# Patient Record
Sex: FEMALE | Race: WHITE | Employment: FULL TIME | URBAN - METROPOLITAN AREA
[De-identification: names, ages, dates, MRNs, and addresses within clinical notes are randomized per-mention and may not be internally consistent; named-entity substitution may affect disease eponyms.]

---

## 2017-03-08 ENCOUNTER — TELEPHONE (OUTPATIENT)
Dept: BARIATRICS/WEIGHT MGMT | Age: 52
End: 2017-03-08

## 2017-03-14 ENCOUNTER — TELEPHONE (OUTPATIENT)
Dept: BARIATRICS/WEIGHT MGMT | Age: 52
End: 2017-03-14

## 2017-03-21 ENCOUNTER — OFFICE VISIT (OUTPATIENT)
Dept: BARIATRICS/WEIGHT MGMT | Age: 52
End: 2017-03-21

## 2017-03-21 VITALS
BODY MASS INDEX: 38.49 KG/M2 | DIASTOLIC BLOOD PRESSURE: 84 MMHG | HEART RATE: 88 BPM | SYSTOLIC BLOOD PRESSURE: 128 MMHG | HEIGHT: 66 IN | WEIGHT: 239.5 LBS

## 2017-03-21 DIAGNOSIS — Z79.899 HIGH RISK MEDICATIONS (NOT ANTICOAGULANTS) LONG-TERM USE: ICD-10-CM

## 2017-03-21 DIAGNOSIS — E66.9 CLASS 2 OBESITY: Primary | ICD-10-CM

## 2017-03-21 PROCEDURE — 93000 ELECTROCARDIOGRAM COMPLETE: CPT | Performed by: FAMILY MEDICINE

## 2017-03-21 PROCEDURE — 99203 OFFICE O/P NEW LOW 30 MIN: CPT | Performed by: FAMILY MEDICINE

## 2017-03-21 ASSESSMENT — PATIENT HEALTH QUESTIONNAIRE - PHQ9
2. FEELING DOWN, DEPRESSED OR HOPELESS: 0
SUM OF ALL RESPONSES TO PHQ9 QUESTIONS 1 & 2: 0
SUM OF ALL RESPONSES TO PHQ QUESTIONS 1-9: 0
1. LITTLE INTEREST OR PLEASURE IN DOING THINGS: 0

## 2017-03-21 ASSESSMENT — ENCOUNTER SYMPTOMS
EYES NEGATIVE: 1
RESPIRATORY NEGATIVE: 1
GASTROINTESTINAL NEGATIVE: 1

## 2017-04-13 ENCOUNTER — OFFICE VISIT (OUTPATIENT)
Dept: BARIATRICS/WEIGHT MGMT | Age: 52
End: 2017-04-13

## 2017-04-13 VITALS
SYSTOLIC BLOOD PRESSURE: 134 MMHG | HEART RATE: 88 BPM | BODY MASS INDEX: 38.57 KG/M2 | WEIGHT: 240 LBS | HEIGHT: 66 IN | DIASTOLIC BLOOD PRESSURE: 82 MMHG

## 2017-04-13 DIAGNOSIS — E55.9 VITAMIN D INSUFFICIENCY: ICD-10-CM

## 2017-04-13 DIAGNOSIS — E66.9 CLASS 2 OBESITY: Primary | ICD-10-CM

## 2017-04-13 PROCEDURE — 99213 OFFICE O/P EST LOW 20 MIN: CPT | Performed by: FAMILY MEDICINE

## 2017-04-13 ASSESSMENT — ENCOUNTER SYMPTOMS
GASTROINTESTINAL NEGATIVE: 1
EYES NEGATIVE: 1
RESPIRATORY NEGATIVE: 1

## 2017-05-08 ENCOUNTER — OFFICE VISIT (OUTPATIENT)
Dept: BARIATRICS/WEIGHT MGMT | Age: 52
End: 2017-05-08

## 2017-05-08 VITALS
HEART RATE: 96 BPM | DIASTOLIC BLOOD PRESSURE: 84 MMHG | BODY MASS INDEX: 37.45 KG/M2 | HEIGHT: 66 IN | WEIGHT: 233 LBS | SYSTOLIC BLOOD PRESSURE: 122 MMHG

## 2017-05-08 DIAGNOSIS — E66.9 CLASS 2 OBESITY: Primary | ICD-10-CM

## 2017-05-08 DIAGNOSIS — Z71.3 DIETARY COUNSELING AND SURVEILLANCE: ICD-10-CM

## 2017-05-08 PROCEDURE — 99213 OFFICE O/P EST LOW 20 MIN: CPT | Performed by: FAMILY MEDICINE

## 2017-05-08 ASSESSMENT — ENCOUNTER SYMPTOMS
GASTROINTESTINAL NEGATIVE: 1
EYES NEGATIVE: 1
RESPIRATORY NEGATIVE: 1

## 2017-06-05 ENCOUNTER — OFFICE VISIT (OUTPATIENT)
Dept: BARIATRICS/WEIGHT MGMT | Age: 52
End: 2017-06-05

## 2017-06-05 VITALS
SYSTOLIC BLOOD PRESSURE: 124 MMHG | BODY MASS INDEX: 36.8 KG/M2 | HEART RATE: 84 BPM | WEIGHT: 229 LBS | DIASTOLIC BLOOD PRESSURE: 82 MMHG | HEIGHT: 66 IN

## 2017-06-05 DIAGNOSIS — E66.9 CLASS 2 OBESITY: Primary | ICD-10-CM

## 2017-06-05 PROCEDURE — 99213 OFFICE O/P EST LOW 20 MIN: CPT | Performed by: FAMILY MEDICINE

## 2017-06-06 ASSESSMENT — ENCOUNTER SYMPTOMS
RESPIRATORY NEGATIVE: 1
EYES NEGATIVE: 1
GASTROINTESTINAL NEGATIVE: 1

## 2017-07-18 ENCOUNTER — OFFICE VISIT (OUTPATIENT)
Dept: BARIATRICS/WEIGHT MGMT | Age: 52
End: 2017-07-18

## 2017-07-18 VITALS
WEIGHT: 225 LBS | SYSTOLIC BLOOD PRESSURE: 130 MMHG | DIASTOLIC BLOOD PRESSURE: 86 MMHG | BODY MASS INDEX: 36.16 KG/M2 | HEIGHT: 66 IN | HEART RATE: 84 BPM

## 2017-07-18 DIAGNOSIS — E55.9 VITAMIN D INSUFFICIENCY: ICD-10-CM

## 2017-07-18 DIAGNOSIS — E66.9 CLASS 2 OBESITY: Primary | ICD-10-CM

## 2017-07-18 DIAGNOSIS — Z71.3 DIETARY COUNSELING AND SURVEILLANCE: ICD-10-CM

## 2017-07-18 PROCEDURE — 99213 OFFICE O/P EST LOW 20 MIN: CPT | Performed by: FAMILY MEDICINE

## 2017-07-18 RX ORDER — MULTIVIT-MIN/IRON/FOLIC ACID/K 18-600-40
1 CAPSULE ORAL DAILY
COMMUNITY

## 2017-07-18 ASSESSMENT — ENCOUNTER SYMPTOMS
EYES NEGATIVE: 1
GASTROINTESTINAL NEGATIVE: 1
RESPIRATORY NEGATIVE: 1

## 2017-08-29 ENCOUNTER — OFFICE VISIT (OUTPATIENT)
Dept: BARIATRICS/WEIGHT MGMT | Age: 52
End: 2017-08-29

## 2017-08-29 VITALS
WEIGHT: 222.5 LBS | HEIGHT: 66 IN | SYSTOLIC BLOOD PRESSURE: 118 MMHG | DIASTOLIC BLOOD PRESSURE: 74 MMHG | HEART RATE: 84 BPM | BODY MASS INDEX: 35.76 KG/M2

## 2017-08-29 DIAGNOSIS — Z71.3 DIETARY COUNSELING AND SURVEILLANCE: ICD-10-CM

## 2017-08-29 DIAGNOSIS — E66.9 CLASS 2 OBESITY: Primary | ICD-10-CM

## 2017-08-29 PROCEDURE — 99213 OFFICE O/P EST LOW 20 MIN: CPT | Performed by: FAMILY MEDICINE

## 2017-08-29 ASSESSMENT — ENCOUNTER SYMPTOMS
GASTROINTESTINAL NEGATIVE: 1
EYES NEGATIVE: 1
RESPIRATORY NEGATIVE: 1

## 2017-10-12 ENCOUNTER — OFFICE VISIT (OUTPATIENT)
Dept: BARIATRICS/WEIGHT MGMT | Age: 52
End: 2017-10-12

## 2017-10-12 VITALS
BODY MASS INDEX: 35.2 KG/M2 | HEIGHT: 66 IN | SYSTOLIC BLOOD PRESSURE: 130 MMHG | DIASTOLIC BLOOD PRESSURE: 84 MMHG | WEIGHT: 219 LBS | HEART RATE: 84 BPM

## 2017-10-12 DIAGNOSIS — E66.9 CLASS 2 OBESITY: Primary | ICD-10-CM

## 2017-10-12 DIAGNOSIS — Z71.3 DIETARY COUNSELING AND SURVEILLANCE: ICD-10-CM

## 2017-10-12 PROCEDURE — 99213 OFFICE O/P EST LOW 20 MIN: CPT | Performed by: FAMILY MEDICINE

## 2017-10-12 ASSESSMENT — ENCOUNTER SYMPTOMS
EYES NEGATIVE: 1
GASTROINTESTINAL NEGATIVE: 1
RESPIRATORY NEGATIVE: 1

## 2017-10-12 NOTE — PROGRESS NOTES
Patient: Staci Oleary                      Encounter Date: 10/12/2017    YOB: 1965               Age: 47 y.o. Chief Complaint   Patient presents with    Bariatrics Post Op Follow Up     7th MWM, 1200 daphne/ LC       /84   Pulse 84   Ht 5' 6\" (1.676 m)   Wt 219 lb (99.3 kg)   BMI 35.35 kg/m²     Body mass index is 35.35 kg/m². Waist Circumference  Waist (Inches): 40.5 in    HPI: 47 y.o. female with a long-standing history of obesity presents today for follow-up. She has lost 3.5 pounds since her last visit. Current treatment includes 1200-Calorie, low carb diet and exercise. Food recall reviewed. Overall, making good dietary choices. She is being mindful of her carb intake. She's lost 20.5 pounds to date. She continues to be motivated to keep losing weight. Diet: []LCHF/Ketogenic   [x]Modified low-calorie/low carb diet  []Low-calorie diet          []Maintenance       []Other:         Adherent? [x]Yes     []No       Side effects: No          Exercise: [x]Cardio     [x]Resistance/strength training     []Other: No intentional exercise, but trying to be physically active     No Known Allergies      Current Outpatient Prescriptions:     Cholecalciferol (VITAMIN D) 2000 units CAPS capsule, Take 1 capsule by mouth daily, Disp: , Rfl:     ibuprofen (ADVIL;MOTRIN) 200 MG tablet, Take 200 mg by mouth every 6 hours as needed for Pain, Disp: , Rfl:     There is no problem list on file for this patient. Review of Systems   HENT: Negative. Eyes: Negative. Respiratory: Negative. Cardiovascular: Negative. Gastrointestinal: Negative. Endocrine: Negative. Musculoskeletal: Negative. Neurological: Negative. Psychiatric/Behavioral: Negative. Physical Exam   Constitutional: She is oriented to person, place, and time. She appears well-developed and well-nourished. HENT:   Head: Normocephalic. Cardiovascular: Normal rate, regular rhythm and normal heart sounds. Exam reveals no gallop and no friction rub. No murmur heard. Pulmonary/Chest: Effort normal and breath sounds normal. No respiratory distress. She has no wheezes. She has no rales. Neurological: She is alert and oriented to person, place, and time. Skin: Skin is warm and dry. Psychiatric: She has a normal mood and affect. Her behavior is normal. Judgment and thought content normal.       No results found for any previous visit. Assessment and Plan:    ICD-10-CM ICD-9-CM    1. Class 2 obesity (HCC) E66.9 278.00 Improving. Patient commended on her weight loss. Continue current management. Follow-up in 68 weeks. 2. BMI 35.0-35.9,adult Z68.35 V85.35    3. Dietary counseling and surveillance Z71.3 V65.3 Greater than 50% of this 15 minute visit was used in direct counseling. Nutrition plan: [] LCHF/Ketogenic   [x] Modified low-calorie diet (low carb/low-daphne)               [] Low-calorie diet    []Maintenance       []Other    Exercise: [x]Cardio     [x]Resistance/strength training                       [x]ACSM recommendations (150 minutes/week in active weight loss)                              Behavior: [x]Motivational interviewing performed    [] Referral for counseling                         [x]Discussed strategies to overcome habits/challenges for focus         [] Stress management   [x] Stimulus control    Reviewed:  [x] Nutrition and the importance of regular protein intake  [x] Hidden carbohydrate sources  [x] Alcohol use  [x] Tobacco use   [x] Importance of exercise and reducing sedentary time      No orders of the defined types were placed in this encounter. No Follow-up on file. This dictation was performed with a verbal recognition program (Dragon) and all efforts were made to ensure accuracy of this dictation. It is possible that there are still dictated errors within this note. If so, please bring any errors to my attention for correction.

## 2017-10-12 NOTE — PATIENT INSTRUCTIONS
changes in your eating habits. Instead of a diet, focus on lifestyle changes that will improve your health and achieve the right balance of energy and calories. To lose weight, you need to burn more calories than you take in. You can do it by eating healthy foods in reasonable amounts and becoming more active, even a little bit every day. Making small changes over time can add up to a lot. Make a plan for change. Many people have found that naming their reasons for change and staying focused on their plan can make a big difference. Work with your doctor to create a plan that is right for you. · Ask yourself: Chet Pain are my personal, most powerful reasons for wanting this change? What will my life look like when I've made the change? \"  · Set your long-term goal. Make it specific, such as \"I will lose x pounds. \"  · Break your long-term goal into smaller, short-term goals. Make these small steps specific and within your reach, things you know you can do. These steps are what keep you going from day to day. How can you stay on your plan for change? Be ready. Choose to start during a time when there are few events that might trigger slip-ups, like holidays, social events, and high-stress periods. Decide on your first few steps. Most people have more success when they make small changes, one step at a time. For example, you might switch a daily candy bar to a piece of fruit, walk 10 minutes more, or add more vegetables to a meal.  Line up your support people. Make sure you're not going to be alone as you make this change. Connect with people who understand how important it is to you. Ask family members and friends for help in keeping with your plan. And think about who could make it harder for you, and how to handle them. Try tracking. People who keep track of what they eat, feel, and do are better at losing weight. Try writing down things like:  · What and how much you eat.   · How you feel before and after each

## 2017-12-07 ENCOUNTER — OFFICE VISIT (OUTPATIENT)
Dept: BARIATRICS/WEIGHT MGMT | Age: 52
End: 2017-12-07

## 2017-12-07 VITALS
DIASTOLIC BLOOD PRESSURE: 82 MMHG | HEIGHT: 66 IN | HEART RATE: 84 BPM | SYSTOLIC BLOOD PRESSURE: 124 MMHG | WEIGHT: 216.5 LBS | BODY MASS INDEX: 34.79 KG/M2

## 2017-12-07 DIAGNOSIS — Z71.3 DIETARY COUNSELING AND SURVEILLANCE: ICD-10-CM

## 2017-12-07 DIAGNOSIS — E66.9 CLASS 1 OBESITY: Primary | ICD-10-CM

## 2017-12-07 PROCEDURE — 99213 OFFICE O/P EST LOW 20 MIN: CPT | Performed by: FAMILY MEDICINE

## 2017-12-07 ASSESSMENT — ENCOUNTER SYMPTOMS
RESPIRATORY NEGATIVE: 1
EYES NEGATIVE: 1
GASTROINTESTINAL NEGATIVE: 1

## 2017-12-07 NOTE — PROGRESS NOTES
Patient: Boaz Carter                      Encounter Date: 12/7/2017    YOB: 1965               Age: 47 y.o. Chief Complaint   Patient presents with    Weight Management     8th MWM, 1200 daphne/ LC       /82   Pulse 84   Ht 5' 6\" (1.676 m)   Wt 216 lb 8 oz (98.2 kg)   BMI 34.94 kg/m²     Body mass index is 34.94 kg/m². Waist Circumference  Waist (Inches): 39 in    HPI: 47 y.o. female with a long-standing history of obesity presents today for follow-up. She's lost 2.5 pounds since her last visit on 10/12. Current treatment includes 1200-Calorie, low carb diet and exercise. She hasn't been tracking her macronutrients/calories but is staying mindful. Hasn't been exercising as much as before. Met with the dietitian today. Food recall and assessment reviewed. Has lost 23 pounds to date. Diet: []LCHF/Ketogenic   [x]Modified low-calorie/low carb diet  []Low-calorie diet          []Maintenance       []Other:                 Exercise: [x]Cardio-~1 hour/week     []Resistance/strength training     []Other: No intentional exercise, but trying to be physically active     No Known Allergies      Current Outpatient Prescriptions:     Cholecalciferol (VITAMIN D) 2000 units CAPS capsule, Take 1 capsule by mouth daily, Disp: , Rfl:     ibuprofen (ADVIL;MOTRIN) 200 MG tablet, Take 200 mg by mouth every 6 hours as needed for Pain, Disp: , Rfl:     There is no problem list on file for this patient. Review of Systems   HENT: Negative. Eyes: Negative. Respiratory: Negative. Cardiovascular: Negative. Gastrointestinal: Negative. Endocrine: Negative. Musculoskeletal: Negative. Neurological: Negative. Psychiatric/Behavioral: Negative. Physical Exam   Constitutional: She is oriented to person, place, and time. She appears well-developed and well-nourished. Neurological: She is alert and oriented to person, place, and time. Skin: Skin is warm and dry.    Psychiatric: She has a normal mood and affect. Her behavior is normal. Judgment and thought content normal.       No results found for any previous visit. Assessment and Plan:    ICD-10-CM ICD-9-CM    1. Class 1 obesity E66.9 278.00 Improving. Reinforced dietitian's plan. Focus on limiting carbs to net 75 grams/day. Increase exercise. F/u in 2 months. 2. BMI 34.0-34.9,adult Z68.34 V85.34    3. Dietary counseling and surveillance Z71.3 V65.3 Greater than 50% of this 15 minute visit was used in direct counseling. Nutrition plan: [] LCHF/Ketogenic   [x] Modified low-calorie diet (low carb/low-daphne)               [] Low-calorie diet    []Maintenance       []Other    Exercise: [x]Cardio     [x]Resistance/strength training                       [x]ACSM recommendations (150 minutes/week in active weight loss)                              Behavior: [x]Motivational interviewing performed    [] Referral for counseling                         [x]Discussed strategies to overcome habits/challenges for focus         [] Stress management   [] Stimulus control                    [] Sleep hygiene    Reviewed:  [x] Nutrition and the importance of regular protein intake  [x] Hidden carbohydrate sources  [x] Alcohol use  [x] Tobacco use   [x] Importance of exercise and reducing sedentary time      No orders of the defined types were placed in this encounter. No Follow-up on file. This dictation was performed with a verbal recognition program (Dragon) and all efforts were made to ensure accuracy of this dictation. It is possible that there are still dictated errors within this note. If so, please bring any errors to my attention for correction.

## 2017-12-07 NOTE — PATIENT INSTRUCTIONS
meal.  · Details about each meal (like eating out or at home, eating alone, or with friends or family). · What you do to be active. Look and plan. As you track, look for patterns that you may want to change. Take note of:  · When you eat and whether you skip meals. · How often you eat out. · How many fruits and vegetables you eat. · When you eat beyond feeling full. · When and why you eat for reasons other than being hungry. When you stray from your plan, don't get upset. Figure out what made you slip up and how you can fix it. Can you take medicines or have surgery to lose weight? Before your doctor will prescribe medicines or surgery, he or she will probably want you to be more active and follow your healthy eating plan for a period of time. These habits are key lifelong changes for managing your weight, with or without other medical treatment. And these changes can help you avoid weight-related health problems. Follow-up care is a key part of your treatment and safety. Be sure to make and go to all appointments, and call your doctor if you are having problems. It's also a good idea to know your test results and keep a list of the medicines you take. Where can you learn more? Go to https://[email or web address]bee.[employer]. org and sign in to your [employer] account. Enter N111 in the [employer] box to learn more about \"Learning About Obesity. \"     If you do not have an account, please click on the \"Sign Up Now\" link. Current as of: October 13, 2016  Content Version: 11.4  © 0357-7683 Healthwise, Incorporated. Care instructions adapted under license by [facility] ([facility]). If you have questions about a medical condition or this instruction, always ask your healthcare professional. [address] any warranty or liability for your use of this information.

## 2017-12-07 NOTE — PROGRESS NOTES
Maxine Galdamez lost 2.5 lbs over past 7 weeks. Pt reports she did stay away from mashed potatoes and stuffing, but did have smaller amounts of pasta and pie at [facility]. Current treatment plan:   Patient is not on medication. Negative side effects from medications? N/A  Patient is not on Optifast.   Patient is  on diet and exercise only plan. Treatment plan details: 1200 calorie low carb     Is patient adhering to diet/meal plan- mostly     Carbohydrate consumption: more mindful     Breakfast: 2 eggs sometimes with cheese and salsa, or 1 egg with piece of ham and cheese    Snack: peanuts, or celery and PB, or carbsmart yogurt, or string cheese     Lunch: chili (no spaghetti noodles), or salad with chicken, or chicken and veggies (corn and string beans)      Snack: sometimes might have popcorn, or peanuts, or celery and PB, or carbsmart yogurt, or string cheese     Dinner: sauerkraut and mets, or taco salad     Snack: varies- popcorn     Fluids: Coffee with creamer (32 oz), water, skinny girl danial mix several times per month     Consuming at least 64oz of calorie free fluids? 52 oz/day     Participating in intentional exercise? Reports she has not been exercising as much d/t being busy (still doing some walking) and gym-cardio 1-2 week.     Plan/Goals: increase fluid intake, continue with diet and exercise, keep alcohol limited, set reminders to drink    Handouts: none    Bg Rivera

## 2018-02-06 ENCOUNTER — OFFICE VISIT (OUTPATIENT)
Dept: BARIATRICS/WEIGHT MGMT | Age: 53
End: 2018-02-06

## 2018-02-06 VITALS
HEIGHT: 66 IN | SYSTOLIC BLOOD PRESSURE: 128 MMHG | DIASTOLIC BLOOD PRESSURE: 84 MMHG | HEART RATE: 88 BPM | BODY MASS INDEX: 35.36 KG/M2 | WEIGHT: 220 LBS

## 2018-02-06 DIAGNOSIS — E66.9 CLASS 2 OBESITY: Primary | ICD-10-CM

## 2018-02-06 DIAGNOSIS — Z71.3 DIETARY COUNSELING AND SURVEILLANCE: ICD-10-CM

## 2018-02-06 PROCEDURE — 99213 OFFICE O/P EST LOW 20 MIN: CPT | Performed by: FAMILY MEDICINE

## 2018-02-06 ASSESSMENT — ENCOUNTER SYMPTOMS
RESPIRATORY NEGATIVE: 1
EYES NEGATIVE: 1
GASTROINTESTINAL NEGATIVE: 1

## 2018-02-06 NOTE — PROGRESS NOTES
Aissatou Costello gained 3.5 lbs over 2 month    Current treatment plan:   Patient is not on medication. Negative side effects from medications? no  Patient is not on Optifast.   Patient is  on diet and exercise only plan. Treatment plan details: 1200 calorie, low carb     Is patient adhering to diet/meal plan: Somewhat    Carbohydrate consumption: Not trackings    Breakfast: 2 eggs + 1/2 slice cheese + strawberries + 32 oz coffee w/ creamer (16 oz regular and 16 oz decaff)    Snack: Popcorn w/ PB and granola     Lunch: Spinach salad mix w/ taco meat/salsa/celery/carrots + ranch and     Snack: Apple w/ PB  Snack: V8 and vanilla waffers after giving blood    Dinner: [place] chili + hotdog     Consuming at least 64oz of calorie free fluids? Yes water    Participating in intentional exercise?  Off track d/t weather     Plan/Goals:   - Increase exercise to 3 days/week  - Start tracking- goals: 1200 calories, 75 grams carbs, 90 grams protein, 60 grams fat  - Continue increasing water and decrease coffee creamer     Handouts: protein bar    [employer]
has no wheezes. She has no rales. Neurological: She is alert and oriented to person, place, and time. Skin: Skin is warm and dry. Psychiatric: She has a normal mood and affect. Her behavior is normal. Judgment and thought content normal.       No results found for any previous visit. Assessment and Plan:    ICD-10-CM ICD-9-CM    1. Class 2 obesity E66.9 278.00 Encouraged patient to get back on track with following the recommended meal plan. 1200-Calorie, low carb meal plan provided and reviewed with patient. Increase physical activity. Follow-up in 6 weeks. 2. BMI 35.0-35.9,adult Z68.35 V85.35    3. Dietary counseling and surveillance Z71.3 V65.3        Nutrition plan: [] LCHF/Ketogenic   [x] Modified low-calorie diet (low carb/low-daphne)               [] Low-calorie diet    []Maintenance       []Other    Exercise: [x]Cardio     [x]Resistance/strength training                       [x]ACSM recommendations (150 minutes/week in active weight loss)                              Behavior: [x]Motivational interviewing performed    [x] Referral for counseling (support groups)                        [x]Discussed strategies to overcome habits/challenges for focus         [] Stress management   [x] Stimulus control                    [] Sleep hygiene    Reviewed:  [x] Nutrition and the importance of regular protein intake  [x] Hidden carbohydrate sources  [x] Alcohol use  [x] Tobacco use   [x] Importance of exercise and reducing sedentary time      Total weight loss: 19.5 pounds      No orders of the defined types were placed in this encounter. No Follow-up on file. Greater than 50% of this 20 minute visit was used in direct counseling. This dictation was performed with a verbal recognition program (Dragon) and all efforts were made to ensure accuracy of this dictation. It is possible that there are still dictated errors within this note.  If so, please bring any errors to my attention for

## 2018-03-23 ENCOUNTER — OFFICE VISIT (OUTPATIENT)
Dept: BARIATRICS/WEIGHT MGMT | Age: 53
End: 2018-03-23

## 2018-03-23 VITALS
DIASTOLIC BLOOD PRESSURE: 80 MMHG | WEIGHT: 219 LBS | HEIGHT: 66 IN | BODY MASS INDEX: 35.2 KG/M2 | SYSTOLIC BLOOD PRESSURE: 122 MMHG | HEART RATE: 80 BPM

## 2018-03-23 DIAGNOSIS — Z71.3 DIETARY COUNSELING AND SURVEILLANCE: ICD-10-CM

## 2018-03-23 DIAGNOSIS — E66.9 CLASS 2 OBESITY: Primary | ICD-10-CM

## 2018-03-23 PROCEDURE — 99213 OFFICE O/P EST LOW 20 MIN: CPT | Performed by: FAMILY MEDICINE

## 2018-03-23 ASSESSMENT — ENCOUNTER SYMPTOMS
EYES NEGATIVE: 1
GASTROINTESTINAL NEGATIVE: 1
RESPIRATORY NEGATIVE: 1

## 2018-03-23 NOTE — PATIENT INSTRUCTIONS
changes in your eating habits. Instead of a diet, focus on lifestyle changes that will improve your health and achieve the right balance of energy and calories. To lose weight, you need to burn more calories than you take in. You can do it by eating healthy foods in reasonable amounts and becoming more active, even a little bit every day. Making small changes over time can add up to a lot. Make a plan for change. Many people have found that naming their reasons for change and staying focused on their plan can make a big difference. Work with your doctor to create a plan that is right for you. · Ask yourself: Tanisha Locks are my personal, most powerful reasons for wanting this change? What will my life look like when I've made the change? \"  · Set your long-term goal. Make it specific, such as \"I will lose x pounds. \"  · Break your long-term goal into smaller, short-term goals. Make these small steps specific and within your reach, things you know you can do. These steps are what keep you going from day to day. How can you stay on your plan for change? Be ready. Choose to start during a time when there are few events that might trigger slip-ups, like holidays, social events, and high-stress periods. Decide on your first few steps. Most people have more success when they make small changes, one step at a time. For example, you might switch a daily candy bar to a piece of fruit, walk 10 minutes more, or add more vegetables to a meal.  Line up your support people. Make sure you're not going to be alone as you make this change. Connect with people who understand how important it is to you. Ask family members and friends for help in keeping with your plan. And think about who could make it harder for you, and how to handle them. Try tracking. People who keep track of what they eat, feel, and do are better at losing weight. Try writing down things like:  · What and how much you eat.   · How you feel before and after each

## 2018-05-04 ENCOUNTER — OFFICE VISIT (OUTPATIENT)
Dept: BARIATRICS/WEIGHT MGMT | Age: 53
End: 2018-05-04

## 2018-05-04 VITALS
SYSTOLIC BLOOD PRESSURE: 110 MMHG | HEART RATE: 96 BPM | WEIGHT: 220 LBS | BODY MASS INDEX: 35.36 KG/M2 | DIASTOLIC BLOOD PRESSURE: 72 MMHG | HEIGHT: 66 IN

## 2018-05-04 DIAGNOSIS — Z71.3 DIETARY COUNSELING AND SURVEILLANCE: ICD-10-CM

## 2018-05-04 DIAGNOSIS — E66.9 CLASS 2 OBESITY: Primary | ICD-10-CM

## 2018-05-04 PROCEDURE — 99213 OFFICE O/P EST LOW 20 MIN: CPT | Performed by: FAMILY MEDICINE

## 2018-05-04 ASSESSMENT — ENCOUNTER SYMPTOMS
EYES NEGATIVE: 1
RESPIRATORY NEGATIVE: 1
GASTROINTESTINAL NEGATIVE: 1

## 2019-12-09 ENCOUNTER — OFFICE VISIT (OUTPATIENT)
Dept: ORTHOPEDIC SURGERY | Age: 54
End: 2019-12-09
Payer: COMMERCIAL

## 2019-12-09 VITALS
SYSTOLIC BLOOD PRESSURE: 134 MMHG | HEART RATE: 74 BPM | HEIGHT: 66 IN | RESPIRATION RATE: 12 BRPM | DIASTOLIC BLOOD PRESSURE: 93 MMHG | WEIGHT: 227 LBS | BODY MASS INDEX: 36.48 KG/M2

## 2019-12-09 DIAGNOSIS — M25.561 ACUTE PAIN OF RIGHT KNEE: Primary | ICD-10-CM

## 2019-12-09 DIAGNOSIS — M17.11 ARTHRITIS OF RIGHT KNEE: ICD-10-CM

## 2019-12-09 PROCEDURE — 99203 OFFICE O/P NEW LOW 30 MIN: CPT | Performed by: ORTHOPAEDIC SURGERY

## 2019-12-09 PROCEDURE — 20610 DRAIN/INJ JOINT/BURSA W/O US: CPT | Performed by: ORTHOPAEDIC SURGERY

## 2019-12-09 RX ORDER — ESTRADIOL 1 MG/1
1 TABLET ORAL DAILY
COMMUNITY

## 2019-12-09 RX ORDER — LIDOCAINE HYDROCHLORIDE 10 MG/ML
4 INJECTION, SOLUTION INFILTRATION; PERINEURAL ONCE
Status: COMPLETED | OUTPATIENT
Start: 2019-12-09 | End: 2019-12-09

## 2019-12-09 RX ORDER — METHYLPREDNISOLONE ACETATE 40 MG/ML
80 INJECTION, SUSPENSION INTRA-ARTICULAR; INTRALESIONAL; INTRAMUSCULAR; SOFT TISSUE ONCE
Status: COMPLETED | OUTPATIENT
Start: 2019-12-09 | End: 2019-12-09

## 2019-12-09 RX ADMIN — LIDOCAINE HYDROCHLORIDE 4 ML: 10 INJECTION, SOLUTION INFILTRATION; PERINEURAL at 16:34

## 2019-12-09 RX ADMIN — METHYLPREDNISOLONE ACETATE 80 MG: 40 INJECTION, SUSPENSION INTRA-ARTICULAR; INTRALESIONAL; INTRAMUSCULAR; SOFT TISSUE at 16:34

## 2019-12-09 ASSESSMENT — ENCOUNTER SYMPTOMS
RESPIRATORY NEGATIVE: 1
EYES NEGATIVE: 1
GASTROINTESTINAL NEGATIVE: 1
ALLERGIC/IMMUNOLOGIC NEGATIVE: 1

## 2022-06-01 ENCOUNTER — OFFICE VISIT (OUTPATIENT)
Dept: ORTHOPEDIC SURGERY | Age: 57
End: 2022-06-01
Payer: COMMERCIAL

## 2022-06-01 VITALS — HEIGHT: 66 IN | BODY MASS INDEX: 38.09 KG/M2 | WEIGHT: 237 LBS

## 2022-06-01 DIAGNOSIS — M79.671 RIGHT FOOT PAIN: Primary | ICD-10-CM

## 2022-06-01 DIAGNOSIS — M72.2 PLANTAR FASCIITIS OF RIGHT FOOT: ICD-10-CM

## 2022-06-01 PROCEDURE — 99213 OFFICE O/P EST LOW 20 MIN: CPT | Performed by: PHYSICIAN ASSISTANT

## 2022-06-01 RX ORDER — IBUPROFEN 600 MG/1
600 TABLET ORAL EVERY 8 HOURS PRN
Qty: 90 TABLET | Refills: 1 | Status: SHIPPED | OUTPATIENT
Start: 2022-06-01 | End: 2022-07-31

## 2022-06-01 NOTE — PROGRESS NOTES
Subjective:      Patient ID: Eusebio Leigh is a 61 y.o.  female. HPI:   She is here for an initial evaluation of right heel pain. Onset of symptoms 5/23/2022. These symptoms have been progressive in nature. There is not a history of injury. She did recently start working out at [employer], doing Pilates and stretching with thera-bands. Pain is intermittent, moderate to severe. Location of pain is on the plantar aspect of the heel. Pain is on average 6/10. Pain is worse with weight bearing activities. Pain improves with rest and elevation. There is not associated numbness/ tingling. She states her son is getting  in the next 2 to 3 weeks. She is wanting to be able to walk comfortably without a limp. She does have a history of using shoe inserts in the past but has not worn the inserts for several years. Previous treatments have included: Ice, ibuprofen with mild  relief or improvement. Review of Systems:  I have reviewed the clinically relevant past medical history, medications, allergies, family history, social history, and 13 point Review of Systems from the patient's recent history form & documented any details relevant to today's presenting complaints in the history above. The patient's self-reported past medical history, medications, allergies, family history, social history, and Review of Systems form from today's date have been scanned into the chart under the \"Media\" tab.       Past Medical History:   Diagnosis Date    Arthritis     knee       Family History   Problem Relation Age of Onset    High Blood Pressure Mother     High Cholesterol Mother     Stroke Mother     High Blood Pressure Father     Diabetes Father     Heart Disease Father     High Blood Pressure Brother        Past Surgical History:   Procedure Laterality Date    BLADDER SUSPENSION      CHOLECYSTECTOMY      COLONOSCOPY  10/24/2016    Dr. Eben Myles normal colon    EYE SURGERY      DETACHED RETINA    HYSTERECTOMY      KNEE CARTILAGE SURGERY Left     KNEE SURGERY Left     VARICOSE VEIN SURGERY         Social History     Occupational History    Not on file   Tobacco Use    Smoking status: Never Smoker    Smokeless tobacco: Never Used   Substance and Sexual Activity    Alcohol use: Yes     Comment: occasionally    Drug use: No    Sexual activity: Not on file       Current Outpatient Medications   Medication Sig Dispense Refill    ibuprofen (ADVIL;MOTRIN) 600 MG tablet Take 1 tablet by mouth every 8 hours as needed for Pain 90 tablet 1    estradiol (ESTRACE) 1 MG tablet Take 1 mg by mouth daily      Cholecalciferol (VITAMIN D) 2000 units CAPS capsule Take 1 capsule by mouth daily       No current facility-administered medications for this visit. Objective:     She is alert, oriented x 3, pleasant, well nourished, developed and in no acute distress. Ht 5' 6\" (1.676 m)   Wt 237 lb (107.5 kg)   BMI 38.25 kg/m²      Examination of the right foot:  There is no soft tissue swelling. There is no obvious deformity. There is moderate bony tenderness to palpation over the plantar aspect of the calcaneous. There is moderate pain with stretching of the heel cord. Achilles tendon is intact. There is mild increased pronation in stance. Examination of the lower extremities are intact with sensation to light touch. Motor testing  5/5 in all major motor groups. Gait is antalgic. Examination of the lower extremities shows intact perfusion to all extremities. No cyanosis. Digits are warm to touch, capillary refill is less than 2 seconds. no edema noted. Examination of the skin over both lower extremities reveals: The skin to be intact without lacerations or abrasions. No significant erythema. No rashes or skin lesions. X Rays: performed in the office today:   AP, lateral and oblique x-ray of the right foot shows a fairly large calcaneal spur.   No acute fractures. Assessment:       ICD-10-CM    1. Right foot pain  M79.671 XR FOOT RIGHT (MIN 3 VIEWS)   2. Plantar fasciitis of right foot  M72.2         Plan:     Assessment:  Probable plantar fasciitis of the right foot. Cannot completely exclude a calcaneus stress fracture with increased activity. She does have a history of wearing shoe inserts years ago but has not worn for several years. I had an extensive discussion with Ms. Anthony Dodge and/or family regarding the natural history, etiology, and long term consequences of her condition. I have presented reasonable alternatives to the patient's proposed care, treatment, and services. Risks and benefits of the treatment options also reviewed in detail. I have outlined a treatment plan with them. She has had full opportunity to ask her questions. I have answered them all to her satisfaction. I feel that Ms. Anthony Dodge understands our discussion today. Plan:  Medications-prescription for ibuprofen 600 mg every 8 hours was provided today. PT-a home exercise program instruction sheet was provided for planter fasciitis. Further Imaging-possible MRI if symptoms worsen or fail to respond to conservative treatment within the next 1 to 2 weeks    Procedures-no surgical indication. Follow up:   Call or return to clinic if these symptoms worsen or fail to improve as anticipated. The total time spent on today's visit including reviewing test results, history, performance of physical exam, counseling/ education, ordering of medications, tests or procedures was 25 minutes. This time does include completion of the medical record. This time excludes any time spent performing procedures or tests in the office. Matthews Cheadle PA-C   Senior Physician Assistant   [facility]                                         Disclaimer:   This note was generated with use of a verbal recognition program (DRAGON) and an attempt was made to check for errors. It is possible that there are still dictated errors within this office note. If so, please bring any significant errors to my attention for an addendum. All efforts were made to ensure that this office note is accurate.

## 2022-08-11 LAB — MAMMOGRAPHY, EXTERNAL: NORMAL

## 2023-02-22 SDOH — HEALTH STABILITY: PHYSICAL HEALTH: ON AVERAGE, HOW MANY DAYS PER WEEK DO YOU ENGAGE IN MODERATE TO STRENUOUS EXERCISE (LIKE A BRISK WALK)?: 3 DAYS

## 2023-02-22 SDOH — HEALTH STABILITY: PHYSICAL HEALTH: ON AVERAGE, HOW MANY MINUTES DO YOU ENGAGE IN EXERCISE AT THIS LEVEL?: 60 MIN

## 2023-02-23 ENCOUNTER — OFFICE VISIT (OUTPATIENT)
Dept: ORTHOPEDIC SURGERY | Age: 58
End: 2023-02-23

## 2023-02-23 VITALS — RESPIRATION RATE: 16 BRPM | WEIGHT: 240 LBS | BODY MASS INDEX: 38.57 KG/M2 | HEIGHT: 66 IN

## 2023-02-23 DIAGNOSIS — M17.12 PRIMARY OSTEOARTHRITIS OF LEFT KNEE: Primary | ICD-10-CM

## 2023-02-23 NOTE — PROGRESS NOTES
[facility] [place] [facility]  [facility]    Chief Complaint: Left knee pain    HPI:  Katiana Ibarra is a 61 y.o. who is here for initial evaluation of left knee pain. Her history is significant for right total knee arthroplasty with Dr. Rossi Spencer at [facility] in November 2020. She also underwent left knee arthroscopy at [facility] over 10 years ago. There is no recent injury. She fell last summer and that did worsen her knee pain. She denies hip pain. She reports pain on a near daily basis that is worse with walking. She has been treated in the past with medications and steroid injections although none recently. She denies hip pain. She walks without assistive device. The patient has difficulty climbing stairs, difficulty getting out of a chair, difficulty with activities of daily living including hygiene and pain at night. Pain level at rest is 7 and with activities 9-10/10. She is otherwise healthy and denies diabetes, tobacco use, history of blood clots, blood thinners. She has help at home. Patient Active Problem List   Diagnosis    Plantar fasciitis of right foot       ROS:  Constitutional: denies fever, chills, weight loss  MSK: denies pain in other joints, muscle aches  Neurological: denies numbness, tingling, weakness    Exam:  Resp. rate 16, height 5' 6\" (1.676 m), weight 240 lb (108.9 kg). Appearance: sitting in exam room chair, appears to be in no acute distress, awake and alert  Resp: unlabored breathing on room air  Skin: warm, dry and intact with out erythema or significant increased temperature  Neuro: grossly intact both lower extremities. Intact sensation to light touch. Motor exam 4+ to 5/5 in all major motor groups. Left knee: Examination reveals that knee range of motion is 0 to 130 degrees. There is varus deformity, positive crepitus, positive joint line tenderness, positive antalgic gait. Neurologically, plantar flexion and dorsiflexion is intact. 5/5 strength. Imagin views of the left knee were performed and interpreted today. Significant for tricompartmental degenerative changes with bone-on-bone arthritis of the medial compartment. Assessment:  Left knee osteoarthritis    Plan:  We discussed the diagnosis and treatment options. She is having pain on a near daily basis due to bone-on-bone arthritis of the left knee. She has tried conservative treatments in the past and continues to have symptoms. We discussed left total knee arthroplasty. The operative procedure, alternatives, and risks were discussed in detail with the patient. The risks include but are not limited to: Infection, vessel injury, nerve injury, DVT, pulmonary embolism, implant loosening, need for revision surgery, loss of motion, continued pain. Despite these risks the patient would like to proceed. All questions have been answered and no guarantees have been made. The patient is unable to do further physical therapy due to disabling pain. I discussed with the patient the diagnosis in detail and answered all the questions. The patient verbalized understanding of the plan as it has been described above and is in agreement. Plan for left total knee arthroplasty. This may be done as an outpatient surgery. Total time spent on today's encounter was at least 31 minutes. This time included reviewing prior notes, radiographs, and lab results when available, reviewing history obtained by medical assistant, performing history and physical exam, reviewing tests/radiographs with the patient, counseling the patient, ordering medications or tests, documentation in the electronic health record, and coordination of care. This dictation was done with Dragon dictation and may contain mechanical errors related to translation.

## 2023-03-13 ENCOUNTER — TELEPHONE (OUTPATIENT)
Dept: ORTHOPEDIC SURGERY | Age: 58
End: 2023-03-13

## 2023-03-21 ENCOUNTER — HOSPITAL ENCOUNTER (OUTPATIENT)
Dept: PHYSICAL THERAPY | Age: 58
Setting detail: THERAPIES SERIES
Discharge: HOME | End: 2023-03-21
Payer: COMMERCIAL

## 2023-03-21 PROCEDURE — 97110 THERAPEUTIC EXERCISES: CPT

## 2023-03-21 PROCEDURE — 97112 NEUROMUSCULAR REEDUCATION: CPT

## 2023-03-21 PROCEDURE — 97161 PT EVAL LOW COMPLEX 20 MIN: CPT

## 2023-03-21 NOTE — THERAPY EVALUATION
scanned into medical record. Patient has been instructed to contact their primary care physician regarding ROS issues if not already being addressed at this time. Co-morbidities/Complexities (which will affect course of rehabilitation):   []None           Arthritic conditions   []Rheumatoid arthritis (M05.9)  [x]Osteoarthritis (M19.91)   Cardiovascular conditions   []Hypertension (I10)  []Hyperlipidemia (E78.5)  []Angina pectoris (I20)  []Atherosclerosis (I70)   Musculoskeletal conditions   []Disc pathology   []Congenital spine pathologies   []Prior surgical intervention  []Osteoporosis (M81.8)  []Osteopenia (M85.8)   Endocrine conditions   []Hypothyroid (E03.9)  []Hyperthyroid Gastrointestinal conditions   []Constipation (D95.53)   Metabolic conditions   []Morbid obesity (E66.01)  []Diabetes type 1(E10.65) or 2 (E11.65)   []Neuropathy (G60.9)     Pulmonary conditions   []Asthma (J45)  []Coughing   []COPD (J44.9)   Psychological Disorders  []Anxiety (F41.9)  []Depression (F32.9)   []Other:   []Other:          Barriers to/and or personal factors that will affect rehab potential:              []Age  []Sex              []Motivation/Lack of Motivation                        []Co-Morbidities              []Cognitive Function, education/learning barriers              []Environmental, home barriers              []profession/work barriers  []past PT/medical experience  []other:      Falls Risk Assessment (30 days):   [x] Falls Risk assessed and no intervention required.   [] Falls Risk assessed and Patient requires intervention due to being higher risk   TUG score (>12s at risk):     [] Falls education provided, including       Functional Assessment:   Scale: WOMAC   Score 26%    ASSESSMENT:   Functional Impairments:     []Noted lumbar/proximal hip/LE joint hypomobility   [x]Decreased LE functional ROM   []Decreased core/proximal hip strength and neuromuscular control   [x]Decreased LE functional strength   []Reduced

## 2023-03-21 NOTE — FLOWSHEET NOTE
[address] and [facility], [place]                                                          Physical Therapy Daily Treatment Note  Date:  3/21/2023    Patient Name:  Katie Vizcaino    :  1965  MRN: [id number]    Medical/Treatment Diagnosis Information:  Diagnosis: M17.12 (ICD-10-CM) - Primary osteoarthritis of left knee  Treatment Diagnosis: M25.562 Left knee pain  Insurance/Certification information:  [facility]  Physician Information:  Referring Provider (secondary): Kelvin Wadsworth  Has the plan of care been signed (Y/N):        [x]  Yes  []  No     Date of Patient follow up with Physician: maeve       Is this a Progress Report:     []  Yes  [x]  No        Progress report will be due (10 Rx or 30 days whichever is less): 19       Recertification will be due (POC Duration  / 90 days whichever is less):          Visit # Insurance Allowable Auth Required   1 30 []  Yes []  No        Functional Scale: WOMAC 26%     Date assessed:  3/21/23      Latex Allergy:  [x]NO      []YES  Preferred Language for Healthcare:   [x]English       []other:    Pain level:  2-5/10     SUBJECTIVE:  See eval    OBJECTIVE: See eval  Observation:   Test measurements:              ROM PROM AROM Overpressure Comment     L R L R L R     Flexion     100 110         Extension     0 0                                                   Strength  L R Comment   Knee Extension 4 4+     Knee flexion  4 4        Standing Balance Time (secs)   Feet together 10   Offset Stance (surgical foot in back) 10   Tandem (surgical foot in back) 7 second before hand touch   Single Limb Stance (surgical side) 3 second before hand touch       Functional Testing Score  Comment   TUG (seconds) 8 seconds      30 sec Sit<>Stand 10     6 minute walk (meters)                 RESTRICTIONS/PRECAUTIONS:     Exercises/Interventions:     Exercise/Equipment

## 2023-03-31 ENCOUNTER — TELEPHONE (OUTPATIENT)
Dept: ORTHOPEDIC SURGERY | Age: 58
End: 2023-03-31

## 2023-04-06 ENCOUNTER — HOSPITAL ENCOUNTER (OUTPATIENT)
Dept: PHYSICAL THERAPY | Age: 58
Setting detail: THERAPIES SERIES
Discharge: HOME | End: 2023-04-06
Payer: COMMERCIAL

## 2023-04-06 PROCEDURE — 97530 THERAPEUTIC ACTIVITIES: CPT

## 2023-04-06 PROCEDURE — 97112 NEUROMUSCULAR REEDUCATION: CPT

## 2023-04-06 PROCEDURE — 97110 THERAPEUTIC EXERCISES: CPT

## 2023-04-06 NOTE — FLOWSHEET NOTE
Straight Leg Raise  - 1 x daily - 7 x weekly - 2-3 sets - 10 reps  - Supine Bridge  - 1 x daily - 7 x weekly - 3 sets - 10 reps  - Seated Long Arc Quad  - 1 x daily - 7 x weekly - 3 sets - 10 reps  - Standing Terminal Knee Extension with Resistance  - 1 x daily - 7 x weekly - 3 sets - 10 reps  - Single Leg Stance with Support  - 1 x daily - 7 x weekly - 10 reps - 10 hold      GOALS:  Patient stated goal: prep for sx     Therapist goals for Patient:   Short Term Goals: To be achieved in: 2 weeks  1. Pt will be independent HEP and progression per patient tolerance, in order to prevent re-injury. -MET  2. Patient will have a decrease in pain to facilitate improvement in movement, function, and ADLs as indicated by functional deficits. - MET    Overall Progression Towards Functional goals/ Treatment Progress Update:  [] Patient is progressing as expected towards functional goals listed. [] Progression is slowed due to complexities/Impairments listed. [] Progression has been slowed due to co-morbidities. [x] Plan just implemented, too soon to assess goals progression <30days   [] Goals require adjustment due to lack of progress  [] Patient is not progressing as expected and requires additional follow up with physician  [] Other    Prognosis for POC: [x] Good [] Fair  [] Poor      Patient requires continued skilled intervention: [x] Yes  [] No    Treatment/Activity Tolerance:  [x] Patient able to complete treatment  [] Patient limited by fatigue  [] Patient limited by pain     [] Patient limited by other medical complications  [] Other: pt able to progress some exercises for further quad and hamstring strengthening today without increased pain. Pt will continue with HEP independently and follow up after sx.          Return to Play: (if applicable)   []  Stage 1: Intro to Strength   []  Stage 2: Return to Run and Strength   []  Stage 3: Return to Jump and Strength   []  Stage 4: Dynamic Strength and Agility   []  Stage

## 2023-04-17 ENCOUNTER — TELEPHONE (OUTPATIENT)
Dept: ORTHOPEDIC SURGERY | Age: 58
End: 2023-04-17

## 2023-04-24 ENCOUNTER — HOSPITAL ENCOUNTER (OUTPATIENT)
Dept: PREADMISSION TESTING | Age: 58
Discharge: HOME | End: 2023-04-28
Payer: COMMERCIAL

## 2023-04-24 DIAGNOSIS — M17.12 PRIMARY OSTEOARTHRITIS OF LEFT KNEE: ICD-10-CM

## 2023-04-24 LAB
25(OH)D3 SERPL-MCNC: 25.9 NG/ML
ABO + RH BLD: NORMAL
ALBUMIN SERPL-MCNC: 4.3 G/DL (ref 3.4–5)
ANION GAP SERPL CALCULATED.3IONS-SCNC: 7 MMOL/L (ref 3–16)
APTT BLD: 26.7 SEC (ref 22.7–35.9)
BASOPHILS # BLD: 0 K/UL (ref 0–0.2)
BASOPHILS NFR BLD: 0.7 %
BILIRUB UR QL STRIP.AUTO: NEGATIVE
BLD GP AB SCN SERPL QL: NORMAL
BUN SERPL-MCNC: 16 MG/DL (ref 7–20)
CALCIUM SERPL-MCNC: 9.3 MG/DL (ref 8.3–10.6)
CHARACTER UR: NORMAL
CHLORIDE SERPL-SCNC: 101 MMOL/L (ref 99–110)
CLARITY UR: CLEAR
CO2 SERPL-SCNC: 29 MMOL/L (ref 21–32)
COLOR UR: YELLOW
CREAT SERPL-MCNC: 0.7 MG/DL (ref 0.6–1.1)
DEPRECATED RDW RBC AUTO: 13.6 % (ref 12.4–15.4)
EOSINOPHIL # BLD: 0.1 K/UL (ref 0–0.6)
EOSINOPHIL NFR BLD: 1.4 %
EST. AVERAGE GLUCOSE BLD GHB EST-MCNC: 119.8 MG/DL
GFR SERPLBLD CREATININE-BSD FMLA CKD-EPI: >60 ML/MIN/{1.73_M2}
GLUCOSE SERPL-MCNC: 98 MG/DL (ref 70–99)
GLUCOSE UR STRIP.AUTO-MCNC: NEGATIVE MG/DL
HBA1C MFR BLD: 5.8 %
HCT VFR BLD AUTO: 41.7 % (ref 36–48)
HGB BLD-MCNC: 13.8 G/DL (ref 12–16)
HGB UR QL STRIP.AUTO: NEGATIVE
INR PPP: 0.99 (ref 0.84–1.16)
KETONES UR STRIP.AUTO-MCNC: NEGATIVE MG/DL
LEUKOCYTE ESTERASE UR QL STRIP.AUTO: ABNORMAL
LYMPHOCYTES # BLD: 1.7 K/UL (ref 1–5.1)
LYMPHOCYTES NFR BLD: 26.7 %
MCH RBC QN AUTO: 31.4 PG (ref 26–34)
MCHC RBC AUTO-ENTMCNC: 33 G/DL (ref 31–36)
MCV RBC AUTO: 95 FL (ref 80–100)
MONOCYTES # BLD: 0.6 K/UL (ref 0–1.3)
MONOCYTES NFR BLD: 10.1 %
NEUTROPHILS # BLD: 3.8 K/UL (ref 1.7–7.7)
NEUTROPHILS NFR BLD: 61.1 %
NITRITE UR QL STRIP.AUTO: NEGATIVE
PH UR STRIP.AUTO: 6 [PH] (ref 5–8)
PLATELET # BLD AUTO: 172 K/UL (ref 135–450)
PMV BLD AUTO: 9.1 FL (ref 5–10.5)
POTASSIUM SERPL-SCNC: 3.5 MMOL/L (ref 3.5–5.1)
PREALB SERPL-MCNC: 19.9 MG/DL (ref 20–40)
PROT UR STRIP.AUTO-MCNC: NEGATIVE MG/DL
PROTHROMBIN TIME: 13.1 SEC (ref 11.5–14.8)
RBC # BLD AUTO: 4.39 M/UL (ref 4–5.2)
RBC #/AREA URNS HPF: NORMAL /HPF (ref 0–4)
SODIUM SERPL-SCNC: 137 MMOL/L (ref 136–145)
SP GR UR STRIP.AUTO: 1.02 (ref 1–1.03)
UA COMPLETE W REFLEX CULTURE PNL UR: ABNORMAL
UA DIPSTICK W REFLEX MICRO PNL UR: YES
URN SPEC COLLECT METH UR: ABNORMAL
UROBILINOGEN UR STRIP-ACNC: 1 E.U./DL
WBC # BLD AUTO: 6.3 K/UL (ref 4–11)
WBC #/AREA URNS HPF: NORMAL /HPF (ref 0–5)

## 2023-04-24 PROCEDURE — 80048 BASIC METABOLIC PNL TOTAL CA: CPT

## 2023-04-24 PROCEDURE — 82306 VITAMIN D 25 HYDROXY: CPT

## 2023-04-24 PROCEDURE — 85025 COMPLETE CBC W/AUTO DIFF WBC: CPT

## 2023-04-24 PROCEDURE — 86900 BLOOD TYPING SEROLOGIC ABO: CPT

## 2023-04-24 PROCEDURE — 81001 URINALYSIS AUTO W/SCOPE: CPT

## 2023-04-24 PROCEDURE — 85610 PROTHROMBIN TIME: CPT

## 2023-04-24 PROCEDURE — 86850 RBC ANTIBODY SCREEN: CPT

## 2023-04-24 PROCEDURE — 84134 ASSAY OF PREALBUMIN: CPT

## 2023-04-24 PROCEDURE — 85730 THROMBOPLASTIN TIME PARTIAL: CPT

## 2023-04-24 PROCEDURE — 82040 ASSAY OF SERUM ALBUMIN: CPT

## 2023-04-24 PROCEDURE — 83036 HEMOGLOBIN GLYCOSYLATED A1C: CPT

## 2023-04-24 PROCEDURE — 87641 MR-STAPH DNA AMP PROBE: CPT

## 2023-04-24 PROCEDURE — 86901 BLOOD TYPING SEROLOGIC RH(D): CPT

## 2023-04-24 NOTE — PROGRESS NOTES
Patient attended JET class on 4/24/2023 . Patient verified surgery for Total Knee replacement. Patient received patient information and educational JET folder including the following handouts: jet powerpoint, covid-19 restrictions, ERAS, incentive spirometry including purpose and how to perform, case management contact information, hand hygiene, preventing constipation, home health care agency list, skilled nursing facility list, pre-operative showering techniques and the use of anti-septic 3 days before surgery. Interviews completed by PT, OT, and Nurse Navigator. Labs and Tests completed as ordered/necessary. Anti-septic bottle given to patient to take home. Patient  states no further questions or concerns. Patient provided orthopedic office, case management, and nurse navigator contact information. Date Of Surgery: 5/2/2023  Surgeon: Dr Annika Gomez  Per Patient Will see/Saw Primary care provider on 4/17/2023. HISTORY OF JOINT REPLACEMENT(S): Total Knee Replacement    DC Plan: Home    HOME ASSISTANCE - WHO WILL BE STAYING WITH YOU AT HOME FOR FIRST SEVERAL DAYS? spouse mona    DC TRANSPORTATION: [facility]    STEPS INTO HOME: 5    STEPS TO BATHROOM/BEDROOM: lives in a quad level, 5 steps for each level    DME NEEDS:  Denies durable medical equipment needs at this time, already has a rolling walker. LENGTH OF STAY HAS BEEN DISCUSSED WITH THE PATIENT, APPROPRIATE TO HIS/ HER PROCEDURE. PATIENT HAS BEEN INFORMED THAT THEY WILL BE DISCHARGED WHEN THE PHYSICIAN DEEMS THEM MEDICALLY READY. MOST PATIENTS CAN EXPECT TO BE IN THE HOSPITAL ONE NIGHT AS AN OBSERVATION ONLY, OR 1-2 DAYS AS AN ADMISSION FOR THOSE WITH MEDICAL HEALTH ISSUES/COMPLICATIONS. CHOICES FOR HHC, DME VENDORS AND SKILLED/ REHAB FACILITIES PROVIDED TO PATIENT DURING THIS INTERVIEW. HOME CARE CHOICE(S): open to any agency, home health care list was provided to patient. .    SNF/REHAB CHOICES (S):     Declined a need for skilled nursing

## 2023-04-25 ENCOUNTER — TELEPHONE (OUTPATIENT)
Dept: ORTHOPEDIC SURGERY | Age: 58
End: 2023-04-25

## 2023-04-25 LAB — MRSA DNA SPEC QL NAA+PROBE: NORMAL

## 2023-04-25 NOTE — TELEPHONE ENCOUNTER
Vitamin D level is low at 25.9. Instructed patient to take over-the-counter Vitamin D 2253-5351 IUs daily.     I called the patient and left a message

## 2023-04-26 RX ORDER — ACETAMINOPHEN 160 MG
TABLET,DISINTEGRATING ORAL DAILY
COMMUNITY

## 2023-04-26 NOTE — TELEPHONE ENCOUNTER
I called the patient and informed her 

## 2023-04-26 NOTE — TELEPHONE ENCOUNTER
PATIENT IS CALLING BACK REGARDING WHAT DOSAGE SHE IS SUPPOSE TO TAKE AND FOR HOW LONG.      PLEASE ADVISE WITH A CALL BACK

## 2023-04-28 ENCOUNTER — OFFICE VISIT (OUTPATIENT)
Dept: ORTHOPEDIC SURGERY | Age: 58
End: 2023-04-28
Payer: COMMERCIAL

## 2023-04-28 VITALS — WEIGHT: 234 LBS | BODY MASS INDEX: 37.61 KG/M2 | HEIGHT: 66 IN | RESPIRATION RATE: 16 BRPM

## 2023-04-28 DIAGNOSIS — M17.12 PRIMARY OSTEOARTHRITIS OF LEFT KNEE: Primary | ICD-10-CM

## 2023-04-28 PROCEDURE — 99214 OFFICE O/P EST MOD 30 MIN: CPT | Performed by: ORTHOPAEDIC SURGERY

## 2023-04-28 NOTE — PROGRESS NOTES
[facility]  Office Visit    Chief Complaint: Left knee pain    HPI:  Samy Rosario is a 61 y.o. who is here in follow-up of left knee pain. She is scheduled for left total knee arthroplasty next week. Her history is significant for right total knee arthroplasty with Dr. Glynn Perez at [facility] in November 2020. She also underwent left knee arthroscopy at [facility] over 10 years ago. There is no recent injury. She fell last summer and that did worsen her knee pain. She denies hip pain. She reports pain on a near daily basis that is worse with walking. She has been treated in the past with medications and steroid injections although none recently. She denies hip pain. She walks without assistive device. The patient has difficulty climbing stairs, difficulty getting out of a chair, difficulty with activities of daily living including hygiene and pain at night. Pain level at rest is 7 and with activities 9-10/10. She is otherwise healthy and denies diabetes, tobacco use, history of blood clots, blood thinners. She has help at home. Patient Active Problem List   Diagnosis    Plantar fasciitis of right foot       ROS:  Constitutional: denies fever, chills, weight loss  MSK: denies pain in other joints, muscle aches  Neurological: denies numbness, tingling, weakness    Exam:  Resp. rate 16, height 5' 6\" (1.676 m), weight 234 lb (106.1 kg). Appearance: sitting in exam room chair, appears to be in no acute distress, awake and alert  Resp: unlabored breathing on room air  Skin: warm, dry and intact with out erythema or significant increased temperature  Neuro: grossly intact both lower extremities. Intact sensation to light touch. Motor exam 4+ to 5/5 in all major motor groups. Left knee: Examination reveals that knee range of motion is 0 to 130 degrees. There is varus deformity, positive crepitus, positive joint line tenderness, positive antalgic gait.   Neurologically, plantar

## 2023-05-01 ENCOUNTER — ANESTHESIA EVENT (OUTPATIENT)
Dept: OPERATING ROOM | Age: 58
End: 2023-05-01
Payer: COMMERCIAL

## 2023-05-02 ENCOUNTER — APPOINTMENT (OUTPATIENT)
Dept: GENERAL RADIOLOGY | Age: 58
End: 2023-05-02
Attending: ORTHOPAEDIC SURGERY
Payer: COMMERCIAL

## 2023-05-02 ENCOUNTER — HOSPITAL ENCOUNTER (OUTPATIENT)
Age: 58
Setting detail: OUTPATIENT SURGERY
Discharge: HOME | End: 2023-05-02
Attending: ORTHOPAEDIC SURGERY | Admitting: ORTHOPAEDIC SURGERY
Payer: COMMERCIAL

## 2023-05-02 ENCOUNTER — ANESTHESIA (OUTPATIENT)
Dept: OPERATING ROOM | Age: 58
End: 2023-05-02
Payer: COMMERCIAL

## 2023-05-02 VITALS
SYSTOLIC BLOOD PRESSURE: 155 MMHG | DIASTOLIC BLOOD PRESSURE: 73 MMHG | BODY MASS INDEX: 37.61 KG/M2 | WEIGHT: 234 LBS | TEMPERATURE: 97.1 F | RESPIRATION RATE: 20 BRPM | HEIGHT: 66 IN | OXYGEN SATURATION: 96 % | HEART RATE: 80 BPM

## 2023-05-02 DIAGNOSIS — M72.2 PLANTAR FASCIITIS OF RIGHT FOOT: Primary | ICD-10-CM

## 2023-05-02 DIAGNOSIS — M17.12 PRIMARY OSTEOARTHRITIS OF LEFT KNEE: ICD-10-CM

## 2023-05-02 LAB
ABO + RH BLD: NORMAL
BLD GP AB SCN SERPL QL: NORMAL
GLUCOSE BLD-MCNC: 115 MG/DL (ref 70–99)
PERFORMED ON: ABNORMAL

## 2023-05-02 PROCEDURE — C1776 JOINT DEVICE (IMPLANTABLE): HCPCS | Performed by: ORTHOPAEDIC SURGERY

## 2023-05-02 PROCEDURE — 97162 PT EVAL MOD COMPLEX 30 MIN: CPT

## 2023-05-02 PROCEDURE — 2580000003 HC RX 258: Performed by: ANESTHESIOLOGY

## 2023-05-02 PROCEDURE — 3600000015 HC SURGERY LEVEL 5 ADDTL 15MIN: Performed by: ORTHOPAEDIC SURGERY

## 2023-05-02 PROCEDURE — 64447 NJX AA&/STRD FEMORAL NRV IMG: CPT | Performed by: ANESTHESIOLOGY

## 2023-05-02 PROCEDURE — 97535 SELF CARE MNGMENT TRAINING: CPT

## 2023-05-02 PROCEDURE — A4217 STERILE WATER/SALINE, 500 ML: HCPCS | Performed by: ORTHOPAEDIC SURGERY

## 2023-05-02 PROCEDURE — 86850 RBC ANTIBODY SCREEN: CPT

## 2023-05-02 PROCEDURE — 97530 THERAPEUTIC ACTIVITIES: CPT

## 2023-05-02 PROCEDURE — A4216 STERILE WATER/SALINE, 10 ML: HCPCS | Performed by: ORTHOPAEDIC SURGERY

## 2023-05-02 PROCEDURE — 86900 BLOOD TYPING SEROLOGIC ABO: CPT

## 2023-05-02 PROCEDURE — 2709999900 HC NON-CHARGEABLE SUPPLY: Performed by: ORTHOPAEDIC SURGERY

## 2023-05-02 PROCEDURE — 6370000000 HC RX 637 (ALT 250 FOR IP): Performed by: ANESTHESIOLOGY

## 2023-05-02 PROCEDURE — 6370000000 HC RX 637 (ALT 250 FOR IP): Performed by: NURSE PRACTITIONER

## 2023-05-02 PROCEDURE — 6360000002 HC RX W HCPCS: Performed by: ORTHOPAEDIC SURGERY

## 2023-05-02 PROCEDURE — 7100000000 HC PACU RECOVERY - FIRST 15 MIN: Performed by: ORTHOPAEDIC SURGERY

## 2023-05-02 PROCEDURE — C9290 INJ, BUPIVACAINE LIPOSOME: HCPCS | Performed by: ORTHOPAEDIC SURGERY

## 2023-05-02 PROCEDURE — 7100000010 HC PHASE II RECOVERY - FIRST 15 MIN: Performed by: ORTHOPAEDIC SURGERY

## 2023-05-02 PROCEDURE — 3600000005 HC SURGERY LEVEL 5 BASE: Performed by: ORTHOPAEDIC SURGERY

## 2023-05-02 PROCEDURE — 7100000001 HC PACU RECOVERY - ADDTL 15 MIN: Performed by: ORTHOPAEDIC SURGERY

## 2023-05-02 PROCEDURE — 2720000010 HC SURG SUPPLY STERILE: Performed by: ORTHOPAEDIC SURGERY

## 2023-05-02 PROCEDURE — 7100000011 HC PHASE II RECOVERY - ADDTL 15 MIN: Performed by: ORTHOPAEDIC SURGERY

## 2023-05-02 PROCEDURE — 97116 GAIT TRAINING THERAPY: CPT

## 2023-05-02 PROCEDURE — 64999 UNLISTED PX NERVOUS SYSTEM: CPT | Performed by: ANESTHESIOLOGY

## 2023-05-02 PROCEDURE — 73560 X-RAY EXAM OF KNEE 1 OR 2: CPT

## 2023-05-02 PROCEDURE — 6360000002 HC RX W HCPCS: Performed by: NURSE PRACTITIONER

## 2023-05-02 PROCEDURE — 2500000003 HC RX 250 WO HCPCS: Performed by: ANESTHESIOLOGY

## 2023-05-02 PROCEDURE — 2580000003 HC RX 258: Performed by: NURSE PRACTITIONER

## 2023-05-02 PROCEDURE — 2580000003 HC RX 258: Performed by: ORTHOPAEDIC SURGERY

## 2023-05-02 PROCEDURE — 97166 OT EVAL MOD COMPLEX 45 MIN: CPT

## 2023-05-02 PROCEDURE — 6360000002 HC RX W HCPCS: Performed by: ANESTHESIOLOGY

## 2023-05-02 PROCEDURE — 86901 BLOOD TYPING SEROLOGIC RH(D): CPT

## 2023-05-02 PROCEDURE — 6370000000 HC RX 637 (ALT 250 FOR IP): Performed by: ORTHOPAEDIC SURGERY

## 2023-05-02 PROCEDURE — 3700000001 HC ADD 15 MINUTES (ANESTHESIA): Performed by: ORTHOPAEDIC SURGERY

## 2023-05-02 PROCEDURE — 3700000000 HC ANESTHESIA ATTENDED CARE: Performed by: ORTHOPAEDIC SURGERY

## 2023-05-02 DEVICE — TIBIAL BEARING INSERT - CS
Type: IMPLANTABLE DEVICE | Site: KNEE | Status: FUNCTIONAL
Brand: TRIATHLON

## 2023-05-02 DEVICE — TIBIAL COMPONENT
Type: IMPLANTABLE DEVICE | Site: KNEE | Status: FUNCTIONAL
Brand: TRIATHLON

## 2023-05-02 DEVICE — PATELLA
Type: IMPLANTABLE DEVICE | Site: PATELLA | Status: FUNCTIONAL
Brand: TRIATHLON

## 2023-05-02 DEVICE — CRUCIATE RETAINING FEMORAL
Type: IMPLANTABLE DEVICE | Site: KNEE | Status: FUNCTIONAL
Brand: TRIATHLON

## 2023-05-02 RX ORDER — FENTANYL CITRATE 50 UG/ML
INJECTION, SOLUTION INTRAMUSCULAR; INTRAVENOUS PRN
Status: DISCONTINUED | OUTPATIENT
Start: 2023-05-02 | End: 2023-05-02 | Stop reason: SDUPTHER

## 2023-05-02 RX ORDER — BUPIVACAINE HYDROCHLORIDE 2.5 MG/ML
INJECTION, SOLUTION EPIDURAL; INFILTRATION; INTRACAUDAL
Status: COMPLETED | OUTPATIENT
Start: 2023-05-02 | End: 2023-05-02

## 2023-05-02 RX ORDER — OXYCODONE HYDROCHLORIDE 10 MG/1
10 TABLET ORAL ONCE
Status: COMPLETED | OUTPATIENT
Start: 2023-05-02 | End: 2023-05-02

## 2023-05-02 RX ORDER — SODIUM CHLORIDE 0.9 % (FLUSH) 0.9 %
5-40 SYRINGE (ML) INJECTION EVERY 12 HOURS SCHEDULED
Status: DISCONTINUED | OUTPATIENT
Start: 2023-05-02 | End: 2023-05-02 | Stop reason: HOSPADM

## 2023-05-02 RX ORDER — GABAPENTIN 300 MG/1
600 CAPSULE ORAL ONCE
Status: COMPLETED | OUTPATIENT
Start: 2023-05-02 | End: 2023-05-02

## 2023-05-02 RX ORDER — MAGNESIUM HYDROXIDE 1200 MG/15ML
LIQUID ORAL CONTINUOUS PRN
Status: COMPLETED | OUTPATIENT
Start: 2023-05-02 | End: 2023-05-02

## 2023-05-02 RX ORDER — ROCURONIUM BROMIDE 10 MG/ML
INJECTION, SOLUTION INTRAVENOUS PRN
Status: DISCONTINUED | OUTPATIENT
Start: 2023-05-02 | End: 2023-05-02 | Stop reason: SDUPTHER

## 2023-05-02 RX ORDER — SODIUM CHLORIDE 9 MG/ML
INJECTION, SOLUTION INTRAVENOUS PRN
Status: DISCONTINUED | OUTPATIENT
Start: 2023-05-02 | End: 2023-05-02 | Stop reason: HOSPADM

## 2023-05-02 RX ORDER — PROPOFOL 10 MG/ML
INJECTION, EMULSION INTRAVENOUS PRN
Status: DISCONTINUED | OUTPATIENT
Start: 2023-05-02 | End: 2023-05-02 | Stop reason: SDUPTHER

## 2023-05-02 RX ORDER — DEXAMETHASONE SODIUM PHOSPHATE 4 MG/ML
INJECTION, SOLUTION INTRA-ARTICULAR; INTRALESIONAL; INTRAMUSCULAR; INTRAVENOUS; SOFT TISSUE PRN
Status: DISCONTINUED | OUTPATIENT
Start: 2023-05-02 | End: 2023-05-02 | Stop reason: SDUPTHER

## 2023-05-02 RX ORDER — SODIUM CHLORIDE 0.9 % (FLUSH) 0.9 %
5-40 SYRINGE (ML) INJECTION PRN
Status: DISCONTINUED | OUTPATIENT
Start: 2023-05-02 | End: 2023-05-02 | Stop reason: HOSPADM

## 2023-05-02 RX ORDER — MIDAZOLAM HYDROCHLORIDE 1 MG/ML
INJECTION INTRAMUSCULAR; INTRAVENOUS PRN
Status: DISCONTINUED | OUTPATIENT
Start: 2023-05-02 | End: 2023-05-02 | Stop reason: SDUPTHER

## 2023-05-02 RX ORDER — ASPIRIN 81 MG/1
81 TABLET ORAL 2 TIMES DAILY
Qty: 28 TABLET | Refills: 0 | Status: SHIPPED | OUTPATIENT
Start: 2023-05-02 | End: 2023-05-16

## 2023-05-02 RX ORDER — OXYCODONE HYDROCHLORIDE 5 MG/1
5 TABLET ORAL
Status: DISCONTINUED | OUTPATIENT
Start: 2023-05-02 | End: 2023-05-02 | Stop reason: HOSPADM

## 2023-05-02 RX ORDER — CEFAZOLIN SODIUM 1 G/3ML
2000 INJECTION, POWDER, FOR SOLUTION INTRAMUSCULAR; INTRAVENOUS ONCE
Status: DISCONTINUED | OUTPATIENT
Start: 2023-05-02 | End: 2023-05-02

## 2023-05-02 RX ORDER — LIDOCAINE HYDROCHLORIDE 20 MG/ML
INJECTION, SOLUTION EPIDURAL; INFILTRATION; INTRACAUDAL; PERINEURAL PRN
Status: DISCONTINUED | OUTPATIENT
Start: 2023-05-02 | End: 2023-05-02 | Stop reason: SDUPTHER

## 2023-05-02 RX ORDER — ONDANSETRON 2 MG/ML
INJECTION INTRAMUSCULAR; INTRAVENOUS PRN
Status: DISCONTINUED | OUTPATIENT
Start: 2023-05-02 | End: 2023-05-02 | Stop reason: SDUPTHER

## 2023-05-02 RX ORDER — OXYCODONE HYDROCHLORIDE 5 MG/1
5-10 TABLET ORAL EVERY 6 HOURS PRN
Qty: 40 TABLET | Refills: 0 | Status: SHIPPED | OUTPATIENT
Start: 2023-05-02 | End: 2023-05-07

## 2023-05-02 RX ORDER — KETAMINE HCL IN NACL, ISO-OSM 100MG/10ML
SYRINGE (ML) INJECTION PRN
Status: DISCONTINUED | OUTPATIENT
Start: 2023-05-02 | End: 2023-05-02 | Stop reason: SDUPTHER

## 2023-05-02 RX ORDER — CEPHALEXIN 500 MG/1
500 CAPSULE ORAL SEE ADMIN INSTRUCTIONS
Qty: 2 CAPSULE | Refills: 0 | Status: SHIPPED | OUTPATIENT
Start: 2023-05-02

## 2023-05-02 RX ORDER — MELOXICAM 7.5 MG/1
7.5 TABLET ORAL ONCE
Status: COMPLETED | OUTPATIENT
Start: 2023-05-02 | End: 2023-05-02

## 2023-05-02 RX ORDER — FENTANYL CITRATE 50 UG/ML
25 INJECTION, SOLUTION INTRAMUSCULAR; INTRAVENOUS EVERY 5 MIN PRN
Status: DISCONTINUED | OUTPATIENT
Start: 2023-05-02 | End: 2023-05-02 | Stop reason: HOSPADM

## 2023-05-02 RX ORDER — ONDANSETRON 2 MG/ML
4 INJECTION INTRAMUSCULAR; INTRAVENOUS
Status: DISCONTINUED | OUTPATIENT
Start: 2023-05-02 | End: 2023-05-02 | Stop reason: HOSPADM

## 2023-05-02 RX ORDER — TRANEXAMIC ACID 650 MG/1
1950 TABLET ORAL ONCE
Status: COMPLETED | OUTPATIENT
Start: 2023-05-02 | End: 2023-05-02

## 2023-05-02 RX ORDER — SODIUM CHLORIDE 9 MG/ML
INJECTION, SOLUTION INTRAVENOUS CONTINUOUS PRN
Status: DISCONTINUED | OUTPATIENT
Start: 2023-05-02 | End: 2023-05-02 | Stop reason: SDUPTHER

## 2023-05-02 RX ORDER — ACETAMINOPHEN 500 MG
1000 TABLET ORAL ONCE
Status: COMPLETED | OUTPATIENT
Start: 2023-05-02 | End: 2023-05-02

## 2023-05-02 RX ORDER — DROPERIDOL 2.5 MG/ML
0.62 INJECTION, SOLUTION INTRAMUSCULAR; INTRAVENOUS
Status: DISCONTINUED | OUTPATIENT
Start: 2023-05-02 | End: 2023-05-02 | Stop reason: HOSPADM

## 2023-05-02 RX ADMIN — FENTANYL CITRATE 25 MCG: 50 INJECTION, SOLUTION INTRAMUSCULAR; INTRAVENOUS at 09:34

## 2023-05-02 RX ADMIN — ACETAMINOPHEN 1000 MG: 500 TABLET ORAL at 13:41

## 2023-05-02 RX ADMIN — CEFAZOLIN 2000 MG: 2 INJECTION, POWDER, FOR SOLUTION INTRAMUSCULAR; INTRAVENOUS at 13:40

## 2023-05-02 RX ADMIN — BUPIVACAINE HYDROCHLORIDE 20 ML: 2.5 INJECTION, SOLUTION EPIDURAL; INFILTRATION; INTRACAUDAL at 07:00

## 2023-05-02 RX ADMIN — TRANEXAMIC ACID 1950 MG: 650 TABLET ORAL at 06:46

## 2023-05-02 RX ADMIN — CEFAZOLIN 2000 MG: 2 INJECTION, POWDER, FOR SOLUTION INTRAMUSCULAR; INTRAVENOUS at 07:54

## 2023-05-02 RX ADMIN — LIDOCAINE HYDROCHLORIDE 80 MG: 20 INJECTION, SOLUTION EPIDURAL; INFILTRATION; INTRACAUDAL; PERINEURAL at 07:44

## 2023-05-02 RX ADMIN — CEFAZOLIN 2000 MG: 2 INJECTION, POWDER, FOR SOLUTION INTRAMUSCULAR; INTRAVENOUS at 07:39

## 2023-05-02 RX ADMIN — SODIUM CHLORIDE: 9 INJECTION, SOLUTION INTRAVENOUS at 07:39

## 2023-05-02 RX ADMIN — DEXAMETHASONE SODIUM PHOSPHATE 8 MG: 4 INJECTION, SOLUTION INTRAMUSCULAR; INTRAVENOUS at 07:55

## 2023-05-02 RX ADMIN — SUGAMMADEX 100 MG: 100 INJECTION, SOLUTION INTRAVENOUS at 08:50

## 2023-05-02 RX ADMIN — MELOXICAM 7.5 MG: 7.5 TABLET ORAL at 06:46

## 2023-05-02 RX ADMIN — BUPIVACAINE HYDROCHLORIDE 20 ML: 2.5 INJECTION, SOLUTION EPIDURAL; INFILTRATION; INTRACAUDAL at 07:05

## 2023-05-02 RX ADMIN — MIDAZOLAM 2 MG: 1 INJECTION INTRAMUSCULAR; INTRAVENOUS at 07:00

## 2023-05-02 RX ADMIN — OXYCODONE HYDROCHLORIDE 10 MG: 10 TABLET ORAL at 06:46

## 2023-05-02 RX ADMIN — Medication 30 MG: at 08:48

## 2023-05-02 RX ADMIN — ROCURONIUM BROMIDE 50 MG: 10 INJECTION INTRAVENOUS at 07:44

## 2023-05-02 RX ADMIN — FENTANYL CITRATE 25 MCG: 50 INJECTION, SOLUTION INTRAMUSCULAR; INTRAVENOUS at 09:44

## 2023-05-02 RX ADMIN — SODIUM CHLORIDE: 9 INJECTION, SOLUTION INTRAVENOUS at 06:45

## 2023-05-02 RX ADMIN — ONDANSETRON 4 MG: 2 INJECTION INTRAMUSCULAR; INTRAVENOUS at 08:07

## 2023-05-02 RX ADMIN — FENTANYL CITRATE 50 MCG: 50 INJECTION INTRAMUSCULAR; INTRAVENOUS at 08:48

## 2023-05-02 RX ADMIN — HYDROMORPHONE HYDROCHLORIDE 0.5 MG: 1 INJECTION, SOLUTION INTRAMUSCULAR; INTRAVENOUS; SUBCUTANEOUS at 09:15

## 2023-05-02 RX ADMIN — SUGAMMADEX 100 MG: 100 INJECTION, SOLUTION INTRAVENOUS at 08:42

## 2023-05-02 RX ADMIN — PROPOFOL 120 MG: 10 INJECTION, EMULSION INTRAVENOUS at 07:44

## 2023-05-02 RX ADMIN — HYDROMORPHONE HYDROCHLORIDE 1 MG: 1 INJECTION, SOLUTION INTRAMUSCULAR; INTRAVENOUS; SUBCUTANEOUS at 09:06

## 2023-05-02 RX ADMIN — FENTANYL CITRATE 50 MCG: 50 INJECTION INTRAMUSCULAR; INTRAVENOUS at 07:44

## 2023-05-02 RX ADMIN — GABAPENTIN 600 MG: 300 CAPSULE ORAL at 06:46

## 2023-05-02 RX ADMIN — Medication 20 MG: at 07:44

## 2023-05-02 ASSESSMENT — PAIN SCALES - GENERAL
PAINLEVEL_OUTOF10: 8
PAINLEVEL_OUTOF10: 6
PAINLEVEL_OUTOF10: 7
PAINLEVEL_OUTOF10: 3
PAINLEVEL_OUTOF10: 8
PAINLEVEL_OUTOF10: 3
PAINLEVEL_OUTOF10: 4

## 2023-05-02 ASSESSMENT — PAIN DESCRIPTION - LOCATION
LOCATION: KNEE

## 2023-05-02 ASSESSMENT — PAIN DESCRIPTION - PAIN TYPE
TYPE: SURGICAL PAIN

## 2023-05-02 ASSESSMENT — PAIN DESCRIPTION - DESCRIPTORS
DESCRIPTORS: ACHING
DESCRIPTORS: ACHING
DESCRIPTORS: ACHING;DISCOMFORT
DESCRIPTORS: ACHING

## 2023-05-02 ASSESSMENT — PAIN DESCRIPTION - ORIENTATION
ORIENTATION: LEFT

## 2023-05-02 ASSESSMENT — PAIN - FUNCTIONAL ASSESSMENT
PAIN_FUNCTIONAL_ASSESSMENT: PREVENTS OR INTERFERES WITH MANY ACTIVE NOT PASSIVE ACTIVITIES
PAIN_FUNCTIONAL_ASSESSMENT: PREVENTS OR INTERFERES WITH ALL ACTIVE AND SOME PASSIVE ACTIVITIES
PAIN_FUNCTIONAL_ASSESSMENT: PREVENTS OR INTERFERES SOME ACTIVE ACTIVITIES AND ADLS
PAIN_FUNCTIONAL_ASSESSMENT: 0-10

## 2023-05-02 ASSESSMENT — PAIN DESCRIPTION - FREQUENCY
FREQUENCY: CONTINUOUS

## 2023-05-02 ASSESSMENT — PAIN DESCRIPTION - ONSET
ONSET: ON-GOING

## 2023-05-02 ASSESSMENT — LIFESTYLE VARIABLES: SMOKING_STATUS: 0

## 2023-05-02 NOTE — FLOWSHEET NOTE
Patient continues to complain of 8/10 left knee pain, unrelieved by multiple doses of pain medication. \"I do not feel like it is working. \" IV site clean, dry and intact. Patient able to doze and resting in bed. Will continue to closely monitor.

## 2023-05-02 NOTE — OP NOTE
Patient: Octavio Moreland  YOB: 1965  MRN: [id number]    DATE OF PROCEDURE: 5/2/2023      PREOPERATIVE DIAGNOSIS:  Tricompartmental degenerative osteoarthritis of the left knee. POSTOPERATIVE DIAGNOSIS:  Tricompartmental degenerative osteoarthritis of the left knee. OPERATION PERFORMED:  Robotic-assisted left total knee arthroplasty. SURGEON:  Mainor Choudhury MD     ASSISTANT:  ALYSSIA Cueva    EBL: 100 mL     IMPLANTS:  Corinth Triathlon CR cementless femur size 4  Meron Triathlon cementless tibia size 4  11mm CS polyethylene  32mm cementless asymmetric patella     INDICATIONS:  The patient is a 61 y.o. female who presents for left knee replacement. The patient had been treated conservatively with anti-inflammatories, physical therapy, and intra-articular cortisone injections; these treatments were no longer providing significant relief. We discussed total knee arthroplasty. The operative procedure, alternatives, and risks were discussed in detail with the patient. The risks include but are not limited to: Infection, vessel injury, nerve injury, DVT, pulmonary embolism, implant loosening, need for revision surgery, loss of motion, continued pain. Informed consent for surgery was signed by the patient. OPERATIVE PROCEDURE:  The patient was seen in the preoperative holding area where the site of surgery was marked and informed consent was confirmed. The patient was brought back to the operating room by OR personnel. Anesthesia was administered. The patient was positioned supine on the operating table. The left lower extremity was then prepped and draped in a standard and sterile fashion. A final and formal timeout was then performed which confirmed the correct patient, correct position, and correct site of surgery. IV antibiotics were administered within 1 hour of the skin incision. An anterior midline incision was made over the knee.  Skin and subcutaneous tissue were divided

## 2023-05-02 NOTE — ANESTHESIA PROCEDURE NOTES
Peripheral Block    Patient location during procedure: pre-op  Reason for block: post-op pain management and at surgeon's request  Start time: 5/2/2023 7:00 AM  End time: 5/2/2023 7:05 AM  Staffing  Performed: anesthesiologist   Anesthesiologist: Cami Garduno MD  Preanesthetic Checklist  Completed: patient identified, IV checked, site marked, risks and benefits discussed, surgical/procedural consents, equipment checked, pre-op evaluation, timeout performed, anesthesia consent given, oxygen available, monitors applied/VS acknowledged, fire risk safety assessment completed and verbalized and blood product R/B/A discussed and consented  Peripheral Block   Patient position: supine  Prep: ChloraPrep  Provider prep: mask  Patient monitoring: continuous pulse ox, frequent blood pressure checks, IV access, oxygen and responsive to questions  Block type: Saphenous  Laterality: left  Injection technique: single-shot  Guidance: ultrasound guided    Needle   Needle type: insulated echogenic nerve stimulator needle   Needle gauge: 22 G  Needle localization: ultrasound guidance  Needle length: 10 cm  Assessment   Injection assessment: negative aspiration for heme, no paresthesia on injection, local visualized surrounding nerve on ultrasound and no intravascular symptoms  Paresthesia pain: none  Slow fractionated injection: yes  Hemodynamics: stable  Real-time US image taken/store: yes  Outcomes: uncomplicated    Additional Notes  The patient was placed in a supine position. The patient's thigh and lower leg were uncovered and externally rotated. A chlorohexidine solution was used for preparation of the skin for the nerve block. A time out was performed to identify the patient, procedure, laterality, and allergies. An ultrasound probe was used for guidance. The ultrasound probe was placed along the proximal thigh to identify the femoral artery, vein, and nerve.  The femoral artery was traced along the thigh until the femoral artery

## 2023-05-02 NOTE — CARE COORDINATION
[facility]    DC order noted, all docs needed have been faxed to [facility] for home care services.     Home care to see patient within 24-48 hrs    Kingsley Long RN, BSN CTN  [facility] [phone] rare

## 2023-05-02 NOTE — PLAN OF CARE
and evaluate response   Notify Licensed Independent Practitioner if interventions unsuccessful or patient reports new pain

## 2023-05-02 NOTE — ANESTHESIA PRE PROCEDURE
Department of Anesthesiology  Preprocedure Note       Name:  Sonido Vee   Age:  61 y.o.  :  1965                                          MRN:  [id number]         Date:  2023      Surgeon: Santiago Lawson):  Elizabeth Norman MD    Procedure: Procedure(s):  LEFT TOTAL KNEE REPLACEMENT ROBOTIC ASSISTED    Medications prior to admission:   Prior to Admission medications    Medication Sig Start Date End Date Taking?  Authorizing Provider   Cholecalciferol (VITAMIN D3) 50 MCG (2000) CAPS Take by mouth daily   Yes Historical Provider, MD   estradiol (ESTRACE) 1 MG tablet Take 1 tablet by mouth daily    Historical Provider, MD       Current medications:    Current Facility-Administered Medications   Medication Dose Route Frequency Provider Last Rate Last Admin    sodium chloride flush 0.9 % injection 5-40 mL  5-40 mL IntraVENous 2 times per day Jojo Callahan MD        sodium chloride flush 0.9 % injection 5-40 mL  5-40 mL IntraVENous PRN Jojo Callahan MD        0.9 % sodium chloride infusion   IntraVENous PRN Jojo Callahan  mL/hr at 23 0645 New Bag at 23 0645    ceFAZolin (ANCEF) 2,000 mg in sodium chloride 0.9 % 50 mL IVPB (mini-bag)  2,000 mg IntraVENous Once Elizabeth Norman MD        sod chloride IRR soln 0.9 % irrigation    Continuous PRN Elizabeth Norman MD   1,000 mL at 23 1620       Allergies:  No Known Allergies    Problem List:    Patient Active Problem List   Diagnosis Code    Plantar fasciitis of right foot M72.2       Past Medical History:        Diagnosis Date    Arthritis     knee    Depression 2019    GERD (gastroesophageal reflux disease)     mild    Prolonged emergence from general anesthesia     Wears glasses        Past Surgical History:        Procedure Laterality Date    BLADDER SUSPENSION      CHOLECYSTECTOMY      COLONOSCOPY  10/24/2016    Dr. Leos Boyd normal colon    EYE SURGERY Right     DETACHED RETINA    HYSTERECTOMY (CERVIX STATUS UNKNOWN)      Ovaries and

## 2023-05-02 NOTE — H&P
Update History & Physical    The patient's History and Physical of April 17, 2023 was reviewed with the patient and I examined the patient. There was no change. The surgical site was confirmed by the patient and me. Plan: The risks, benefits, expected outcome, and alternative to the recommended procedure have been discussed with the patient. Patient understands and wants to proceed with the procedure.      Electronically signed by Cailin Aparicio MD on 5/2/2023 at 7:38 AM

## 2023-05-02 NOTE — PROGRESS NOTES
Dajuan performed this morning including Nurse navigator Kenneth Elizabeth, Physical therapist Benjie Baltazar, and Occupational therapist Simon Collins. Discussed plan of care, discharge plan, and dme needs if applicable for orthopedic total joint patient. Notified Daksha Charles NP, mishel , and evangelina with [facility] liaison of same day discharge.
Data- Discharge order received, patient verbalized agreement to discharge, disposition to previous residence, needs noted for [address] [employer] and informed Cristy Luu NP. Action- discharge instructions prepared and given to patient and  Anish Ahuja, patient and Anish Whitings verbalized understanding. Medication information packet given related to NEW and/or CHANGED prescriptions emphasizing name/purpose/side effects, pt verbalized understanding. Discharge instruction summary: Diet- General, Activity- Full weight bearing, Primary Care Physician as follows[facility]jeet Kim, [facility] Follow up appointment with orthopedic office noted below, immunizations reviewed and discussed with patient, prescription medications to be filled by retail pharmacy and then delivered. Inpatient surgical procedure precautions reviewed: Tasha Salguero Educated patient on using incentive spirometer post-discharge per surgeon's instructions. Neurovascular checks performed and moderate dorsi and plantar flexions noted bilaterally, toes appear appropriate to ethnicity in color, Warm to touch, patient denies numbness or tingling in extremities. Left Knee Incision site prineo glue dressing assessed and is clean,dry, and intact, no signs of redness, drainage, or odor noted. patient's bedside RN [facility] notified of patient completing discharge instructions. Nurse Navigator and Orthopedic Office contact information on discharge instructions and provided to patient. Response- Prescriptions to be delivered to patient via meds to bed program. Disposition is home with [facility] and [employer]. Patient to be transported by [employer] .     Future Appointments   Date Time Provider Carlos Carrero   5/15/2023  1:30 PM MD FELICIA Prince [facility]THO [facility]   7/13/2023  8:00 AM ALYSSIA [facility] [place] - DYD     Electronically signed by Hari Berger RN on 5/2/2023 at 3:54 PM
Occupational Therapy  Facility/Department: [facility] OR  Occupational Therapy Initial Assessment    Name: Julian Jarvis  : 1965  MRN: [id number]  Date of Service: 2023    Discharge Recommendations:  2-3 sessions per week, Patient would benefit from continued therapy after discharge, Home with Home health OT, 24 hour supervision or assist        Julian Jarvis scored a 18/24 on the AM-PAC ADL Inpatient form. Current research shows that an AM-PAC score of 18 or greater is typically associated with a discharge to the patient's home setting. Based on the patient's AM-PAC score, and their current ADL deficits, it is recommended that the patient have 2-3 sessions per week of Occupational Therapy at d/c to increase the patient's independence. At this time, this patient demonstrates the endurance and safety to discharge home with [address] [place] [address] (home vs OP services) and a follow up treatment frequency of 2-3x/wk. Please see assessment section for further patient specific details. If patient discharges prior to next session this note will serve as a discharge summary. Please see below for the latest assessment towards goals. Patient Diagnosis(es): The primary encounter diagnosis was Plantar fasciitis of right foot. A diagnosis of Primary osteoarthritis of left knee was also pertinent to this visit. Past Medical History:  has a past medical history of Arthritis, Depression, GERD (gastroesophageal reflux disease), Prolonged emergence from general anesthesia, and Wears glasses. Past Surgical History:  has a past surgical history that includes eye surgery (Right); Hysterectomy; bladder suspension; Knee cartilage surgery (Left); Varicose vein surgery; Cholecystectomy; Colonoscopy (10/24/2016); and knee surgery (Right). Treatment Diagnosis: impaired ADL/fxl mobility    Assessment   Performance deficits / Impairments: Decreased functional mobility ; Decreased endurance;Decreased ADL status; Decreased balance;Decreased
Patient arrived from PACU alert and oriented, vss, 6/10 complaints of pain, but patient is ok at this time.  called back into [place] [address] 2 room at this time.
Report received from [facility]. Pt resting. Urge to void. Pt still has numbness in left knee/ leg. Placed on bed pan. Pt voided large amount of yellow urine. Cleaned pt and changed linens. Pain remains tolerable per pt. Call light within reach.
Exceptions: Wears glasses at all times  Hearing  Hearing: Within functional limits    Cognition   Orientation  Overall Orientation Status: Within Functional Limits     Objective   Observation/Palpation  Observation: incision CDI with prineo dressing in place  Gross Assessment  Tone: Normal  Sensation: Intact   Strength Other  Other: grossly wfl in non-surgical limbs  Bed mobility  Supine to Sit: Contact guard assistance  Sit to Supine: Contact guard assistance  Scooting: Contact guard assistance  Transfers  Sit to Stand: Stand by assistance;Contact guard assistance  Stand to Sit: Stand by assistance  Ambulation  Surface: Level tile  Device: Rolling Walker  Assistance: Stand by assistance;Contact guard assistance  Quality of Gait: step to progressing to partial step through pattern with good heel contact and good weight bearing  Distance: ~80-90 feet  Comments: stable  More Ambulation?: No  Stairs/Curb  Stairs?:  (up and down several steps one rail and cga of one)  Balance  Comments: steady with transfers and ambulation using rolling walker     Goals  Short Term Goals  Time Frame for Short Term Goals: today for discharge to home  Short Term Goal 1: bed mobility at [address] Goal 2: transfers at [facility] 3: ambulation at [facility] wbat rolling walker for household distances    -steps as needed for home entry at [facility] one rail  Short Term Goal 4: exercises at supervision  Patient Goals   Patient Goals : relief of chronic left knee pain and good function       Education  Patient Education  Education Given To: Patient  Education Provided: Role of Therapy;Plan of Care;Transfer Training;Precautions; Home Exercise Program  Education Method: Demonstration;Verbal  Barriers to Learning: None  Education Outcome: Verbalized understanding;Demonstrated understanding      Therapy Time   Individual Concurrent Group Co-treatment   Time In [facility] 141         Time Out 1500         Minutes [address] [facility],[address],

## 2023-05-02 NOTE — ANESTHESIA POSTPROCEDURE EVALUATION
Department of Anesthesiology  Postprocedure Note    Patient: Stacia Grimaldo  MRN: [id number]  YOB: 1965  Date of evaluation: 5/2/2023      Procedure Summary     Date: 05/02/23 Room / Location: [address]    Anesthesia Start: 0598 Anesthesia Stop: 0908    Procedure: LEFT TOTAL KNEE REPLACEMENT ROBOTIC ASSISTED (Left: Knee) Diagnosis:       Arthritis of left knee      (LEFT ARTHRITIS KNEE)    Surgeons: Rosealee Spatz, MD Responsible Provider: Otilia Scales MD    Anesthesia Type: general ASA Status: 2          Anesthesia Type: No value filed. Fawad Phase I: Fawad Score: 10    Fawad Phase II: Fawad Score: 10      Anesthesia Post Evaluation    Patient location during evaluation: PACU  Patient participation: complete - patient participated  Level of consciousness: awake  Airway patency: patent  Nausea & Vomiting: no nausea and no vomiting  Cardiovascular status: blood pressure returned to baseline  Respiratory status: acceptable  Hydration status: stable  Comments: Vital signs stable  OK to discharge from Stage I post anesthesia care.   Care transferred from Anesthesiology department on discharge from perioperative area   Multimodal analgesia pain management approach

## 2023-05-02 NOTE — ANESTHESIA PROCEDURE NOTES
Peripheral Block    Patient location during procedure: pre-op  Reason for block: post-op pain management and at surgeon's request  Start time: 5/2/2023 7:05 AM  End time: 5/2/2023 7:10 AM  Staffing  Performed: anesthesiologist   Anesthesiologist: Daria Durbin MD  Preanesthetic Checklist  Completed: patient identified, IV checked, site marked, risks and benefits discussed, surgical/procedural consents, equipment checked, pre-op evaluation, timeout performed, anesthesia consent given, oxygen available, monitors applied/VS acknowledged, fire risk safety assessment completed and verbalized and blood product R/B/A discussed and consented  Peripheral Block   Patient position: supine  Prep: ChloraPrep  Provider prep: mask and sterile gloves  Patient monitoring: continuous pulse ox, frequent blood pressure checks, IV access, oxygen and responsive to questions  Block type: iPacks  Laterality: left  Injection technique: single-shot  Guidance: ultrasound guided    Needle   Needle type: insulated echogenic nerve stimulator needle   Needle localization: ultrasound guidance  Needle length: 10 cm  Assessment   Injection assessment: no paresthesia on injection, negative aspiration for heme, local visualized surrounding nerve on ultrasound and no intravascular symptoms  Paresthesia pain: none  Slow fractionated injection: yes  Hemodynamics: stable  Outcomes: uncomplicated    Additional Notes  The patient was placed in a supine position. The thigh and lower leg were placed into an external rotation. The skin was prepared with a chlorhexidine solution. A time out was performed to identify the correct patient, procedure, laterality and allergies. An ultrasound probe was used for guidance. The medial aspect of the distal thigh was scanned until the femur, popliteal artery and vein was identified on ultrasound. An echogenic needle was used to advance between the femur and past the popliteal artery.  Local anesthetic solution was injected

## 2023-05-04 ENCOUNTER — TELEPHONE (OUTPATIENT)
Dept: ORTHOPEDIC SURGERY | Age: 58
End: 2023-05-04

## 2023-05-04 ENCOUNTER — TELEPHONE (OUTPATIENT)
Dept: ORTHOPEDICS UNIT | Age: 58
End: 2023-05-04

## 2023-05-04 NOTE — TELEPHONE ENCOUNTER
General Question     Subject: Post Left TKA Sx questions  Patient and /or Facility Request: Rogerkathi Kenzie Number: [phone]     Patient request call back from [address] [place] [address] regarding concerns & questions from recent Left TKA Sx.       Please advise

## 2023-05-04 NOTE — TELEPHONE ENCOUNTER
Spoke with patient regarding post discharge from hospital.    Incision status: No drainage, odor, or redness noted per patient    Edema/Swelling/Teds: edema noted, \"it's going down\", wearing teds    Pain level and status: \"better today than yesterday\" 6-7/10     Use of pain medications: yes ; Patient stated they are taking their pain medication oxycodone as prescribed. Use of ice therapy: yes     Blood thinner: aspirin 81mg ; Verified with patient that they are taking their anticoagulant as prescribed twice a day. Bowels: no constipation, had bm today    Home Care Agency active: yes ; Claims already worked with home therapist  .  Outpatient therapy: n/a    Do you have all of your medications: yes    Changes in medications: no    No other questions/concerns at this time. Encouraged patient to call Orthopedic Nurse Navigator Adam Robertson or Orthopedic office if has any questions/concerns.       Follow up appointments:    Future Appointments   Date Time Provider Carlos Carrero   5/15/2023  1:30 PM MD FELICIA Horn [facility]THO [facility]   7/13/2023  8:00 AM ALYSSIA Reyes [facility] [place] - DYD   Electronically signed by Vasiliy Chua RN on 5/4/2023 at 1:37 PM

## 2023-05-08 ENCOUNTER — TELEPHONE (OUTPATIENT)
Dept: ORTHOPEDIC SURGERY | Age: 58
End: 2023-05-08

## 2023-05-08 RX ORDER — TIZANIDINE 4 MG/1
4 TABLET ORAL 4 TIMES DAILY PRN
Qty: 40 TABLET | Refills: 0 | Status: SHIPPED | OUTPATIENT
Start: 2023-05-08

## 2023-05-08 NOTE — TELEPHONE ENCOUNTER
General Question     Subject: CRAMPING IN HER RT FOOT  Patient and /or Facility Request: Bonita Wisdom Number:  [phone]    PATIENT CALLING FOLLOWING UP ON PREVIOUS MESSAGE REGARDING CRAMPING IN HER RT FOOT. PLEASE CALL BACK PATIENT BACK AT THE ABOVE NUMBER.

## 2023-05-08 NOTE — TELEPHONE ENCOUNTER
General Question     Subject: patient call and she had sx on her lt knee and she is having some cramping in her foot she just need to know what can she take for that? She just need a call back. Please Advise.   Patient Robert Whelan Number: [phone]

## 2023-05-15 ENCOUNTER — OFFICE VISIT (OUTPATIENT)
Dept: ORTHOPEDIC SURGERY | Age: 58
End: 2023-05-15

## 2023-05-15 DIAGNOSIS — Z96.652 STATUS POST TOTAL LEFT KNEE REPLACEMENT: Primary | ICD-10-CM

## 2023-05-15 PROCEDURE — 99024 POSTOP FOLLOW-UP VISIT: CPT | Performed by: PHYSICIAN ASSISTANT

## 2023-05-16 NOTE — PROGRESS NOTES
This dictation was done with [email or web address] dictation and may contain mechanical errors related to translation. There were no vitals taken for this visit. Pleasant 24-year-old female in stable condition after left total knee replacement 5/2/2023. All in all she is doing extremely well this early. Incisions are nicely talked about wound care. She is no fractures including AP lateral and sunrise view of her left knee. She is neurologic intact her left foot with good dorsiflexion plantarflexion strength. Xray three views of the total knee arthroplasty reveals satisfactory alignment of the prosthesis . No signs of significant polyethylene wear or failure. No progressive radiolucencies,fractures, tumors or dislocations. With good x-rays good range of motion of almost 90 degrees already no signs of infection and good x-rays and happy with her overall initial progress.   We will transition her to outpatient physical therapy and she will follow-up with us in 4 weeks

## 2023-05-18 ENCOUNTER — HOSPITAL ENCOUNTER (OUTPATIENT)
Dept: PHYSICAL THERAPY | Age: 58
Setting detail: THERAPIES SERIES
Discharge: HOME | End: 2023-05-18
Payer: COMMERCIAL

## 2023-05-18 PROCEDURE — 97112 NEUROMUSCULAR REEDUCATION: CPT

## 2023-05-18 PROCEDURE — 97016 VASOPNEUMATIC DEVICE THERAPY: CPT

## 2023-05-18 PROCEDURE — 97164 PT RE-EVAL EST PLAN CARE: CPT

## 2023-05-18 PROCEDURE — 97110 THERAPEUTIC EXERCISES: CPT

## 2023-05-18 NOTE — PLAN OF CARE
[address]                                                      Physical Therapy Re-Certification Plan of [place] Form      Dear  Dr. Katie Eason,     We had the pleasure of treating the following patient for physical therapy services at [address] [place] [address]. A summary of our findings can be found in the updated assessment below. This includes our plan of care. If you have any questions or concerns regarding these findings, please do not hesitate to contact me at the office phone number checked above. Thank you for the referral.     Physician Signature:________________________________Date:__________________  By signing above (or electronic signature), therapists plan is approved by physician    Assessment: Pt is a 60 yo female 2 weeks s.p L TKA. She exhibits increased pain,  decreased ROM, decreased strength consistent with postop status. Due to these limitations, the patient is limited in ambulation, ADLs and work duties. Pt will require skilled intervention in order to progress ROM, strength and decrease pain in order to progress tolerance to functional mobility including ambulation without AD and return to PLOF.      Plan: 2-3x per week for 6-8 weeks     Physical Therapy Daily Treatment Note  Date:  2023    Patient Name:  Cheryle Latus    :  1965  MRN: [id number]    Medical/Treatment Diagnosis Information:  Diagnosis: M17.12 (ICD-10-CM) - Primary osteoarthritis of left knee; sp L TKA 23  Treatment Diagnosis: M25.562 Left knee pain  Insurance/Certification information:  PT Insurance Information: [facility]  Physician Information:  Referring Provider (secondary): Katie Eason  Has the plan of care been signed (Y/N):        [x]  Yes  []  No     Date of Patient follow up with Physician: 23      Is this a Progress Report:     []  Yes  [x]  No        Progress report will be due

## 2023-05-23 ENCOUNTER — HOSPITAL ENCOUNTER (OUTPATIENT)
Dept: PHYSICAL THERAPY | Age: 58
Setting detail: THERAPIES SERIES
Discharge: HOME | End: 2023-05-23
Payer: COMMERCIAL

## 2023-05-23 PROCEDURE — 97112 NEUROMUSCULAR REEDUCATION: CPT

## 2023-05-23 PROCEDURE — 97016 VASOPNEUMATIC DEVICE THERAPY: CPT

## 2023-05-23 PROCEDURE — 97530 THERAPEUTIC ACTIVITIES: CPT

## 2023-05-23 PROCEDURE — 97110 THERAPEUTIC EXERCISES: CPT

## 2023-05-23 NOTE — FLOWSHEET NOTE
[address] and [facility], [place]                                                       Physical Therapy Daily Treatment Note  Date:  2023    Patient Name:  Katie Vizcaino    :  1965  MRN: [id number]    Medical/Treatment Diagnosis Information:  Diagnosis: M17.12 (ICD-10-CM) - Primary osteoarthritis of left knee; sp L TKA 23  Treatment Diagnosis: M25.562 Left knee pain  Insurance/Certification information:  PT Insurance Information: [address]  Physician Information:  Referring Provider (secondary): Kelvin Wadsworth  Has the plan of care been signed (Y/N):        [x]  Yes  []  No     Date of Patient follow up with Physician: 23      Is this a Progress Report:     []  Yes  [x]  No        Progress report will be due (10 Rx or 30 days whichever is less):       Recertification will be due (POC Duration  / 90 days whichever is less):          Visit # Insurance Allowable Auth Required   2 28  (2 used prehab) []  Yes []  No        Functional Scale: WOMAC 36%     Date assessed:  23      Latex Allergy:  [x]NO      []YES  Preferred Language for Healthcare:   [x]English       []other:    Pain level:  3-4/10   DOS: 23  SUBJECTIVE:   Pt is 3 weeks sp. She notes she went to the gym on , rode the recumbent bike and did upper body exercises but then noted increased anterior knee pain with SLR. She notes this is the only time anterior knee bothers her, is able to do LAQ pain free. She also notes increased discomfort at night and has difficulty sleeping still more than a few hours at a time. She notes some lateral hip/thigh discomfort as well.      OBJECTIVE:   Observation: pt enters   Test measurements:              ROM PROM AROM Overpressure Comment     L R L  23  R L R     Flexion     105 ERMI 110         Extension     0 propped  0                                                   Strength-deferred L R

## 2023-05-25 ENCOUNTER — HOSPITAL ENCOUNTER (OUTPATIENT)
Dept: PHYSICAL THERAPY | Age: 58
Setting detail: THERAPIES SERIES
Discharge: HOME | End: 2023-05-25
Payer: COMMERCIAL

## 2023-05-25 PROCEDURE — 97016 VASOPNEUMATIC DEVICE THERAPY: CPT

## 2023-05-25 PROCEDURE — 97110 THERAPEUTIC EXERCISES: CPT

## 2023-05-25 PROCEDURE — 97112 NEUROMUSCULAR REEDUCATION: CPT

## 2023-05-25 PROCEDURE — 97530 THERAPEUTIC ACTIVITIES: CPT

## 2023-05-25 NOTE — FLOWSHEET NOTE
[facility] [facility], [place]                                                       Physical Therapy Daily Treatment Note  Date:  2023    Patient Name:  Mady Colunga    :  1965  MRN: [id number]    Medical/Treatment Diagnosis Information:  Diagnosis: M17.12 (ICD-10-CM) - Primary osteoarthritis of left knee; sp L TKA 23  Treatment Diagnosis: M25.562 Left knee pain  Insurance/Certification information:  PT Insurance Information: [address] [facility] [facility]  Physician Information:  Referring Provider (secondary): Darien Parker  Has the plan of care been signed (Y/N):        [x]  Yes  []  No     Date of Patient follow up with Physician: 23      Is this a Progress Report:     []  Yes  [x]  No        Progress report will be due (10 Rx or 30 days whichever is less):       Recertification will be due (POC Duration  / 90 days whichever is less):          Visit # Insurance Allowable Auth Required   3 28  (2 used prehab) []  Yes []  No        Functional Scale: WOMAC 36%     Date assessed:  23      Latex Allergy:  [x]NO      []YES  Preferred Language for Healthcare:   [x]English       []other:    Pain level:  3-4/10   DOS: 23  SUBJECTIVE:   Pt is 3 weeks sp. Pt notes she is a little sore today but no worse than earlier this week.      OBJECTIVE:   Observation: pt enters   Test measurements:              ROM PROM AROM Overpressure Comment     L R L  23  R L R     Flexion     110 ERMI 110         Extension     0 propped  0                                                   Strength-deferred L R Comment   Knee Extension       Knee flexion           Standing Balance - deferred Time (secs)   Feet together    Offset Stance (surgical foot in back)    Tandem (surgical foot in back)    Single Limb Stance (surgical side)       Functional Testing - deferred Score  Comment   TUG (seconds)      30 sec Sit<>Stand      6 minute walk

## 2023-05-30 ENCOUNTER — HOSPITAL ENCOUNTER (OUTPATIENT)
Dept: PHYSICAL THERAPY | Age: 58
Setting detail: THERAPIES SERIES
Discharge: HOME | End: 2023-05-30
Payer: COMMERCIAL

## 2023-05-30 PROCEDURE — 97110 THERAPEUTIC EXERCISES: CPT

## 2023-05-30 PROCEDURE — 97112 NEUROMUSCULAR REEDUCATION: CPT

## 2023-05-30 PROCEDURE — 97530 THERAPEUTIC ACTIVITIES: CPT

## 2023-05-30 NOTE — FLOWSHEET NOTE
[facility] [address] [facility], [place]                                                       Physical Therapy Daily Treatment Note  Date:  2023    Patient Name:  Le Casarez    :  1965  MRN: [id number]    Medical/Treatment Diagnosis Information:  Diagnosis: M17.12 (ICD-10-CM) - Primary osteoarthritis of left knee; sp L TKA 23  Treatment Diagnosis: M25.562 Left knee pain  Insurance/Certification information:  PT Insurance Information: [address] [facility] [facility]  Physician Information:  Referring Provider (secondary): Miladis Freire  Has the plan of care been signed (Y/N):        [x]  Yes  []  No     Date of Patient follow up with Physician: 23      Is this a Progress Report:     []  Yes  [x]  No        Progress report will be due (10 Rx or 30 days whichever is less):       Recertification will be due (POC Duration  / 90 days whichever is less):          Visit # Insurance Allowable Auth Required   4 28  (2 used prehab) []  Yes []  No        Functional Scale: WOMAC 36%     Date assessed:  23      Latex Allergy:  [x]NO      []YES  Preferred Language for Healthcare:   [x]English       []other:    Pain level:  4/10   DOS: 23  SUBJECTIVE:   Pt is 4 weeks sp. Ptnotes she feels a little stiff from activities this weekend but has been trying to use cane only for longer distances.       OBJECTIVE:   Observation: pt enters   Test measurements:              ROM PROM AROM Overpressure Comment     L R L  23  R L R     Flexion     109  ERMI 110         Extension     0 propped  0                                                   Strength-deferred L R Comment   Knee Extension       Knee flexion           Standing Balance - deferred Time (secs)   Feet together    Offset Stance (surgical foot in back)    Tandem (surgical foot in back)    Single Limb Stance (surgical side)       Functional Testing - deferred Score  Comment   TUG

## 2023-06-01 ENCOUNTER — HOSPITAL ENCOUNTER (OUTPATIENT)
Dept: PHYSICAL THERAPY | Age: 58
Setting detail: THERAPIES SERIES
Discharge: HOME | End: 2023-06-01
Payer: COMMERCIAL

## 2023-06-01 PROCEDURE — 97112 NEUROMUSCULAR REEDUCATION: CPT

## 2023-06-01 PROCEDURE — 97530 THERAPEUTIC ACTIVITIES: CPT

## 2023-06-01 PROCEDURE — 97110 THERAPEUTIC EXERCISES: CPT

## 2023-06-01 NOTE — FLOWSHEET NOTE
[facility], [place]                                                       Physical Therapy Daily Treatment Note  Date:  2023    Patient Name:  Chavez Arguello    :  1965  MRN: [id number]    Medical/Treatment Diagnosis Information:  Diagnosis: M17.12 (ICD-10-CM) - Primary osteoarthritis of left knee; sp L TKA 23  Treatment Diagnosis: M25.562 Left knee pain  Insurance/Certification information:  PT Insurance Information: [address]  Physician Information:  Referring Provider (secondary): Rodriguez Neff  Has the plan of care been signed (Y/N):        [x]  Yes  []  No     Date of Patient follow up with Physician: 23      Is this a Progress Report:     []  Yes  [x]  No        Progress report will be due (10 Rx or 30 days whichever is less): 3/11/27      Recertification will be due (POC Duration  / 90 days whichever is less):          Visit # Insurance Allowable Auth Required   5 28  (2 used prehab) []  Yes []  No        Functional Scale: WOMAC 36%     Date assessed:  23      Latex Allergy:  [x]NO      []YES  Preferred Language for Healthcare:   [x]English       []other:    Pain level:  4/10   DOS: 23  SUBJECTIVE:   Pt is 4 weeks sp. Pt notes she went on a walk then did her exercises as well as going to the [facility], she notes a dull ache along quad that occurred after her walk but did not ice until after exercises. She notes she had to take a muscle relaxer.      OBJECTIVE:   Observation: pt enters   Test measurements:              ROM PROM AROM Overpressure Comment     L R L  23  R L R     Flexion     111  ERMI 110         Extension     0 propped  0                                                   Strength-deferred L R Comment   Knee Extension       Knee flexion           Standing Balance - deferred Time (secs)   Feet together    Offset Stance (surgical foot in back)    Tandem (surgical foot

## 2023-06-05 ENCOUNTER — TELEPHONE (OUTPATIENT)
Dept: ORTHOPEDIC SURGERY | Age: 58
End: 2023-06-05

## 2023-06-05 NOTE — TELEPHONE ENCOUNTER
Faxed completed RTW form to [phone]  and [phone]    Emailed a copy to the [email or web address]

## 2023-06-05 NOTE — TELEPHONE ENCOUNTER
Patient is returning to work on 6/13/2023    May get up as needed from desk to move around. Patient would like an email once this is completed.

## 2023-06-05 NOTE — TELEPHONE ENCOUNTER
General Question     Subject: PATIENT STATES SHE IS NEEDING TO DROP OF PAPERWORK FOR HER JOB. PLEASE ADVISE.   Patient Karrie Severin Number: [phone]

## 2023-06-06 ENCOUNTER — HOSPITAL ENCOUNTER (OUTPATIENT)
Dept: PHYSICAL THERAPY | Age: 58
Setting detail: THERAPIES SERIES
Discharge: HOME | End: 2023-06-06
Payer: COMMERCIAL

## 2023-06-06 PROCEDURE — 97530 THERAPEUTIC ACTIVITIES: CPT

## 2023-06-06 PROCEDURE — 97110 THERAPEUTIC EXERCISES: CPT

## 2023-06-06 PROCEDURE — 97112 NEUROMUSCULAR REEDUCATION: CPT

## 2023-06-06 NOTE — FLOWSHEET NOTE
[facility] [address] [facility], [place]                                                       Physical Therapy Daily Treatment Note  Date:  2023    Patient Name:  Katiana Ibarra    :  1965  MRN: [id number]    Medical/Treatment Diagnosis Information:  Diagnosis: M17.12 (ICD-10-CM) - Primary osteoarthritis of left knee; sp L TKA 23  Treatment Diagnosis: M25.562 Left knee pain  Insurance/Certification information:  PT Insurance Information: [address] [facility]  Physician Information:  Referring Provider (secondary): Dameon Jarrell  Has the plan of care been signed (Y/N):        [x]  Yes  []  No     Date of Patient follow up with Physician: 23      Is this a Progress Report:     []  Yes  [x]  No        Progress report will be due (10 Rx or 30 days whichever is less):       Recertification will be due (POC Duration  / 90 days whichever is less):          Visit # Insurance Allowable Auth Required   6 28  (2 used prehab) []  Yes []  No        Functional Scale: WOMAC 36%     Date assessed:  23      Latex Allergy:  [x]NO      []YES  Preferred Language for Healthcare:   [x]English       []other:    Pain level:  2-3/10   DOS: 23  SUBJECTIVE:   Pt is 5 weeks sp. Pt notes pain is improving in knee but she is limping slight dt some foot pain, she does have a call into MD who has seen her in the past for this. She did workout yesterday and did some TRX squats, she notes soreness from this.      OBJECTIVE:   Observation: pt enters   Test measurements:              ROM PROM AROM Overpressure Comment     L R L  23  R L R     Flexion     110  ERMI 110         Extension     0 propped  0                                                   Strength-deferred L R Comment   Knee Extension       Knee flexion           Standing Balance - deferred Time (secs)   Feet together    Offset Stance (surgical foot in back)    Tandem (surgical foot

## 2023-06-08 ENCOUNTER — HOSPITAL ENCOUNTER (OUTPATIENT)
Dept: PHYSICAL THERAPY | Age: 58
Setting detail: THERAPIES SERIES
Discharge: HOME | End: 2023-06-08
Payer: COMMERCIAL

## 2023-06-08 PROCEDURE — 97110 THERAPEUTIC EXERCISES: CPT | Performed by: PHYSICAL THERAPIST

## 2023-06-08 PROCEDURE — 97530 THERAPEUTIC ACTIVITIES: CPT | Performed by: PHYSICAL THERAPIST

## 2023-06-08 PROCEDURE — 97112 NEUROMUSCULAR REEDUCATION: CPT | Performed by: PHYSICAL THERAPIST

## 2023-06-08 NOTE — FLOWSHEET NOTE
RESTRICTIONS/PRECAUTIONS:     Exercises/Interventions:     Exercise/Equipment Resistance/Repetitions Other comments   Stretching     Hamstring [id number]    Hip Flexion     ITB    Grion     Quad     Inclined Calf     Towel Pull [id number]         SLR     Supine 3 x10    Prone     Abduction 3 x10     Adducton     SLR+     Clams     LAQ 3 x 10     Bridge           Isometrics     Quad sets     Hip Adduction     Hamstring                    Patellar Glides     Medial     Superior     Inferior          ROM     Sheet Pulls    Wall Slides     Edge of bed     Flexionator 10x10    Extensionator     Hang Weights     Ankle Pumps                              CKC     Calf raises     Wall sits     Step ups L3 2x10     Step up and over     Lateral Step Downs               Mini squats    CC TKE         Lateral band walks     Side Planks     Half moon     Single leg flow          Biodex-balance     Single leg stance 20 sec x 5    Plyoback          Stool scoots     Bridge    SB HS Curls     Planks          PRE     Extension  RANGE: 90/40   Flexion 60# 3x10  RANGE: 0/90        Cable Column          Leg Press 120# 3x10  ^6/8        Bike 8 min ROM rocking     Treadmill                     Therapeutic Exercise and NMR EXR  [x] (49141) Provided verbal/tactile cueing for activities related to strengthening, flexibility, endurance, ROM for improvements in LE, proximal hip, and core control with self care, mobility, lifting, ambulation. [x] (85672) Provided verbal/tactile cueing for activities related to improving balance, coordination, kinesthetic sense, posture, motor skill, proprioception  to assist with LE, proximal hip, and core control in self care, mobility, lifting, ambulation and eccentric single leg control.      NMR and Therapeutic Activities:    [x] (16190 or 91654) Provided verbal/tactile cueing for activities related to improving balance, coordination, kinesthetic sense, posture, motor skill, proprioception and motor activation General

## 2023-06-20 ENCOUNTER — HOSPITAL ENCOUNTER (OUTPATIENT)
Dept: PHYSICAL THERAPY | Age: 58
Setting detail: THERAPIES SERIES
Discharge: HOME | End: 2023-06-20
Payer: COMMERCIAL

## 2023-06-20 PROCEDURE — 97110 THERAPEUTIC EXERCISES: CPT

## 2023-06-20 PROCEDURE — 97112 NEUROMUSCULAR REEDUCATION: CPT

## 2023-06-20 PROCEDURE — 97530 THERAPEUTIC ACTIVITIES: CPT

## 2023-06-20 NOTE — FLOWSHEET NOTE
mobility as indicated by patients Functional Deficits. [] Progressing: [] Met: [] Not Met: [] Adjusted  4. Patient will return to ambulation on all surfaces without AD and without increased symptoms or restriction. [] Progressing: [] Met: [] Not Met: [] Adjusted  5. Patient will return to ind recreational gym program without increased symptoms or restriction. [] Progressing: [] Met: [] Not Met: [] Adjusted     Overall Progression Towards Functional goals/ Treatment Progress Update:  [] Patient is progressing as expected towards functional goals listed. [] Progression is slowed due to complexities/Impairments listed. [] Progression has been slowed due to co-morbidities. [x] Plan just implemented, too soon to assess goals progression <30days   [] Goals require adjustment due to lack of progress  [] Patient is not progressing as expected and requires additional follow up with physician  [] Other    Prognosis for POC: [x] Good [] Fair  [] Poor      Patient requires continued skilled intervention: [x] Yes  [] No    Treatment/Activity Tolerance:  [x] Patient able to complete treatment  [] Patient limited by fatigue  [] Patient limited by pain     [] Patient limited by other medical complications  [] Other:  pt able to progress ck activities with SL DL and LSD, she notes these are challenging but not painful. Continue to progress as tolerated.          Return to Play: (if applicable)   []  Stage 1: Intro to Strength   []  Stage 2: Return to Run and Strength   []  Stage 3: Return to Jump and Strength   []  Stage 4: Dynamic Strength and Agility   []  Stage 5: Sport Specific Training     []  Ready to Return to Play, Meets All Above Stages   []  Not Ready for Return to Sports   Comments:                               PLAN: See eval  [] Continue per plan of care [] Alter current plan (see comments above)  [x] Plan of care initiated [] Hold pending MD visit [] Discharge  Note: If patient does not return for scheduled/

## 2023-06-22 ENCOUNTER — HOSPITAL ENCOUNTER (OUTPATIENT)
Dept: PHYSICAL THERAPY | Age: 58
Setting detail: THERAPIES SERIES
Discharge: HOME | End: 2023-06-22
Payer: COMMERCIAL

## 2023-06-22 PROCEDURE — 97530 THERAPEUTIC ACTIVITIES: CPT

## 2023-06-22 PROCEDURE — 97112 NEUROMUSCULAR REEDUCATION: CPT

## 2023-06-22 PROCEDURE — 97110 THERAPEUTIC EXERCISES: CPT

## 2023-06-22 NOTE — PLAN OF CARE
follow up with physician  [] Other    Prognosis for POC: [x] Good [] Fair  [] Poor      Patient requires continued skilled intervention: [x] Yes  [] No    Treatment/Activity Tolerance:  [x] Patient able to complete treatment  [] Patient limited by fatigue  [] Patient limited by pain     [] Patient limited by other medical complications  [] Other:  Pt shows improved ROM and strength of LLE. She notes no restrictions with ADLs, ambulation and has returned to full duty work. Pt is independent with HEP and has been going to the gym as well. At this time pt no longer requires skilled intervention, DC PT. Return to Play: (if applicable)   []  Stage 1: Intro to Strength   []  Stage 2: Return to Run and Strength   []  Stage 3: Return to Jump and Strength   []  Stage 4: Dynamic Strength and Agility   []  Stage 5: Sport Specific Training     []  Ready to Return to Play, Meets All Above Stages   []  Not Ready for Return to Sports   Comments:                               PLAN:   [] Continue per plan of care [] Alter current plan (see comments above)  [] Plan of care initiated [] Hold pending MD visit [x] Discharge  Note: If patient does not return for scheduled/ recommended follow up visits, this note will serve as a discharge from care along with most recent update on progress. Reviewed insurance benefits for physical therapy in an outpatient hospital based setting with the patient, including deductible  and allowable visit number.  Pt was informed of possible out of pocket costs, as well as, informed of other service options for continuing supervised sessions without required skilled PT intervention such as the cash based [employer] program.     Electronically signed by:   Wero Willett, PT, DPT, Cert DN

## 2023-06-27 ENCOUNTER — APPOINTMENT (OUTPATIENT)
Dept: PHYSICAL THERAPY | Age: 58
End: 2023-06-27
Payer: COMMERCIAL

## 2023-07-06 ENCOUNTER — APPOINTMENT (OUTPATIENT)
Dept: PHYSICAL THERAPY | Age: 58
End: 2023-07-06
Payer: COMMERCIAL

## 2023-07-11 ENCOUNTER — APPOINTMENT (OUTPATIENT)
Dept: PHYSICAL THERAPY | Age: 58
End: 2023-07-11
Payer: COMMERCIAL

## 2023-07-13 ENCOUNTER — HOSPITAL ENCOUNTER (OUTPATIENT)
Dept: PHYSICAL THERAPY | Age: 58
Setting detail: THERAPIES SERIES
Discharge: HOME | End: 2023-07-13
Payer: COMMERCIAL

## 2023-07-13 PROCEDURE — 97530 THERAPEUTIC ACTIVITIES: CPT

## 2023-07-13 PROCEDURE — 97110 THERAPEUTIC EXERCISES: CPT

## 2023-07-13 PROCEDURE — 97161 PT EVAL LOW COMPLEX 20 MIN: CPT

## 2023-07-13 NOTE — PLAN OF CARE
activities. Classification :    []Signs/symptoms consistent with post-surgical status including decreased ROM, strength and function. []Signs/symptoms consistent with joint sprain/strain   []Signs/symptoms consistent with patella-femoral syndrome   []Signs/symptoms consistent with knee OA/hip OA   []Signs/symptoms consistent with internal derangement of knee/Hip   []Signs/symptoms consistent with functional hip weakness/NMR control      [x]Signs/symptoms consistent with tendinitis/tendinosis    [x]signs/symptoms consistent with pathology which may benefit from Dry needling      []other:      Prognosis/Rehab Potential:      [x]Excellent   []Good    []Fair   []Poor    Tolerance of evaluation/treatment:    [x]Excellent   []Good    []Fair   []Poor    Physical Therapy Evaluation Complexity Justification  [x] A history of present problem with:  [] no personal factors and/or comorbidities that impact the plan of care;  [x]1-2 personal factors and/or comorbidities that impact the plan of care  []3 personal factors and/or comorbidities that impact the plan of care  [x] An examination of body systems using standardized tests and measures addressing any of the following: body structures and functions (impairments), activity limitations, and/or participation restrictions;:  [x] a total of 1-2 or more elements   [] a total of 3 or more elements   [] a total of 4 or more elements   [x] A clinical presentation with:  [x] stable and/or uncomplicated characteristics   [] evolving clinical presentation with changing characteristics  [] unstable and unpredictable characteristics;   [x] Clinical decision making of [] low, [] moderate, [] high complexity using standardized patient assessment instrument and/or measurable assessment of functional outcome.     [x] EVAL (LOW) 16157 (typically 20 minutes face-to-face)  [] EVAL (MOD) 63412 (typically 30 minutes face-to-face)  [] EVAL (HIGH) 88944 (typically 45 minutes face-to-face)  []

## 2023-07-13 NOTE — FLOWSHEET NOTE
[address] [address]  Phone: [phone]   Fax:     [phone]      Date:  2023    Patient Name:  Annette Ellison    :  1965  MRN: [id number]    Pertinent Medical History:      Medical/Treatment Diagnosis Information:  Medical Diagnosis: Foot pain [M79.673]  Treatment Diagnosis: heel pain, limited mobility, function/ strength    Insurance/Certification information:     Physician Information:  Win De Leon DPM  Plan of care signed (Y/N):     Date of Patient follow up with Physician:      Progress Report: []  Yes  [x]  No     Date Range for reporting period:  Beginnin2023  Ending:     Progress report due (10 Rx/or 30 days whichever is less):      Recertification due (POC duration/ or 90 days whichever is less): 10/13/23     Visit # Insurance/POC Allowable Auth Needed   /yr (14 used) []Yes    []No       Latex Allergy:  [x]NO      []YES  Preferred Language for Healthcare:   [x]English       []Other:    Functional Scale:        Test: U [facility]  Date Assessed Score               Pain level:  /10     History of Injury:Pt here for L heel pain which is chronic in nature. The problem has been there for several months, but worse over the past couple months. She recently had a knee replacement in early May and just finished up PT. 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 She points to posterior heel and watershed area of achilles as site of most pain. Pain at terminal stance w/ decreased push off. She has had a cortisone injection about 3 weeks ago and then tweaked her heel while walking and then the podiatrist gave her a round of Rx NSAIDS.         SUBJECTIVE:  See eval    OBJECTIVE:  Observation:   Test measurements:      RESTRICTIONS/PRECAUTIONS:     Exercises/Interventions:     Therapeutic Ex (16804)   Min: Reps/Resistance Notes/CUES        Bilat

## 2023-07-17 ENCOUNTER — HOSPITAL ENCOUNTER (OUTPATIENT)
Dept: PHYSICAL THERAPY | Age: 58
Setting detail: THERAPIES SERIES
Discharge: HOME | End: 2023-07-17
Payer: COMMERCIAL

## 2023-07-17 ENCOUNTER — APPOINTMENT (OUTPATIENT)
Dept: PHYSICAL THERAPY | Age: 58
End: 2023-07-17
Payer: COMMERCIAL

## 2023-07-17 PROBLEM — Z96.653 PRESENCE OF ARTIFICIAL KNEE JOINT, BILATERAL: Status: ACTIVE | Noted: 2023-05-03

## 2023-07-17 PROBLEM — M72.2 PLANTAR FASCIAL FIBROMATOSIS: Status: ACTIVE | Noted: 2023-05-03

## 2023-07-17 PROCEDURE — 97110 THERAPEUTIC EXERCISES: CPT

## 2023-07-17 PROCEDURE — 97530 THERAPEUTIC ACTIVITIES: CPT

## 2023-07-17 NOTE — FLOWSHEET NOTE
by: Jones Velarde, PT , DPT  #[id number]      Note: If patient does not return for scheduled/recommended follow up visits, this note will serve as a discharge from care along with the most recent update on progress.

## 2023-07-20 ENCOUNTER — APPOINTMENT (OUTPATIENT)
Dept: PHYSICAL THERAPY | Age: 58
End: 2023-07-20
Payer: COMMERCIAL

## 2023-07-21 ENCOUNTER — APPOINTMENT (OUTPATIENT)
Dept: PHYSICAL THERAPY | Age: 58
End: 2023-07-21
Payer: COMMERCIAL

## 2023-07-24 ENCOUNTER — HOSPITAL ENCOUNTER (OUTPATIENT)
Dept: PHYSICAL THERAPY | Age: 58
Setting detail: THERAPIES SERIES
Discharge: HOME | End: 2023-07-24
Payer: COMMERCIAL

## 2023-07-24 PROCEDURE — 97140 MANUAL THERAPY 1/> REGIONS: CPT

## 2023-07-24 PROCEDURE — 97110 THERAPEUTIC EXERCISES: CPT

## 2023-07-24 NOTE — FLOWSHEET NOTE
[address] [address]  Phone: [phone]   Fax:     [phone]      Date:  2023    Patient Name:  Deisy Limon    :  1965  MRN: [id number]    Pertinent Medical History:      Medical/Treatment Diagnosis Information:  Medical Diagnosis: Foot pain [M79.673]  Treatment Diagnosis: heel pain, limited mobility, function/ strength    Insurance/Certification information:     Physician Information:  Gina Logan DPM  Plan of care signed (Y/N):     Date of Patient follow up with Physician:      Progress Report: []  Yes  [x]  No     Date Range for reporting period:  Beginnin2023  Ending:     Progress report due (10 Rx/or 30 days whichever is less):      Recertification due (POC duration/ or 90 days whichever is less): 10/13/23     Visit # Insurance/POC Allowable Auth Needed   3 /  30/yr (14 used) []Yes    []No       Latex Allergy:  [x]NO      []YES  Preferred Language for Healthcare:   [x]English       []Other:    Functional Scale:        Test: [facility]  Date Assessed Score               Pain level:  /10     History of Injury:Pt here for L heel pain which is chronic in nature. The problem has been there for several months, but worse over the past couple months. She recently had a knee replacement in early May and just finished up PT. 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 She points to posterior heel and watershed area of achilles as site of most pain. Pain at terminal stance w/ decreased push off. She has had a cortisone injection about 3 weeks ago and then tweaked her heel while walking and then the podiatrist gave her a round of Rx NSAIDS. SUBJECTIVE:  Pt states foot is doing ok. She reports cont'd soreness around medial and lateral calcaneus, but also extending up into her medial arch area.      OBJECTIVE:  Observation:   Test measurements:

## 2023-07-25 RX ORDER — AMOXICILLIN 500 MG/1
CAPSULE ORAL
Qty: 4 CAPSULE | Refills: 1 | Status: SHIPPED | OUTPATIENT
Start: 2023-07-25

## 2023-07-26 ENCOUNTER — HOSPITAL ENCOUNTER (OUTPATIENT)
Dept: PHYSICAL THERAPY | Age: 58
Setting detail: THERAPIES SERIES
Discharge: HOME | End: 2023-07-26
Payer: COMMERCIAL

## 2023-07-26 PROCEDURE — 97110 THERAPEUTIC EXERCISES: CPT

## 2023-07-26 PROCEDURE — 97140 MANUAL THERAPY 1/> REGIONS: CPT

## 2023-07-26 NOTE — FLOWSHEET NOTE
endurance, ROM of core, proximal hip and LE for functional self-care, mobility, lifting and ambulation/stair navigation   [x] (72164)Reviewed/Progressed HEP activities related to improving balance, coordination, kinesthetic sense, posture, motor skill, proprioception of core, proximal hip and LE for self care, mobility, lifting, and ambulation/stair navigation      Manual Treatments:  PROM / STM / Oscillations-Mobs:  G-I, II, III, IV (PA's, Inf., Post.)  [x] (94688) Provided manual therapy to mobilize LE, proximal hip and/or LS spine soft tissue/joints for the purpose of modulating pain, promoting relaxation,  increasing ROM, reducing/eliminating soft tissue swelling/inflammation/restriction, improving soft tissue extensibility and allowing for proper ROM for normal function with self care, mobility, lifting and ambulation. Modalities:  [] (13246) Vasopneumatic compression: Utilized vasopneumatic compression to decrease edema / swelling for the purpose of improving mobility and quad tone / recruitment which will allow for increased overall function including but not limited to self-care, transfers, ambulation, and ascending / descending stairs. Approval Dates:  CPT Code Units Approved Units Used  Date Updated:                     Charges:  Timed Code Treatment Minutes: 45   Total Treatment Minutes: 45      [] EVAL (LOW) 62744 (typically 20 minutes face-to-face)  [] EVAL (MOD) 58665 (typically 30 minutes face-to-face)  [] EVAL (HIGH) 08320 (typically 45 minutes face-to-face)  [] RE-EVAL     [x] PK(52985) x     [] Dry needle 1 or 2 Muscles (12423)  [] NMR (47468) x     [] Dry needle 3+ Muscles (05175)  [x] Manual (96528) x  2   [] Ultrasound (64696) x  [] TA (39389) x     [] Mech Traction (55935)  [] ES(attended) (43011)     [] ES (un) (24284):   [] Vasopump (52670) [] Ionto (08506)   [] Other:    Cesia Rush stated goal: able to walk community distances w/o heel pain.    [] Progressing: [] Met: [] Not

## 2023-07-27 ENCOUNTER — APPOINTMENT (OUTPATIENT)
Dept: PHYSICAL THERAPY | Age: 58
End: 2023-07-27
Payer: COMMERCIAL

## 2023-07-31 ENCOUNTER — HOSPITAL ENCOUNTER (OUTPATIENT)
Dept: PHYSICAL THERAPY | Age: 58
Setting detail: THERAPIES SERIES
Discharge: HOME | End: 2023-07-31
Payer: COMMERCIAL

## 2023-07-31 PROCEDURE — 97140 MANUAL THERAPY 1/> REGIONS: CPT

## 2023-07-31 PROCEDURE — 97110 THERAPEUTIC EXERCISES: CPT

## 2023-07-31 NOTE — FLOWSHEET NOTE
She has f/u w/ podiatrist later this week. OBJECTIVE:  Observation:   Test measurements:    ROM 7/17/2023 7/24/23 7/31     Knee ROM    R: 0-105*  L: 0-100*                                     Strength        Hip abd R: 21#  L: 17#       Hip ext R 32#  L: 27#       Calf raise R: 5/5  L: 4/5        Posterior tib  5/5 bilat              TESTS/ OTHER        30s STS (17\" chair) (norm =12-17reps) 5x               Gait Increased pronation on L during stance phase. Mild to mod trendelenberg gait       palpation  L Mod tender plantar fascia insertion                  RESTRICTIONS/PRECAUTIONS:     Exercises/Interventions:     Therapeutic Ex (67096)   Min: Reps/Resistance Notes/CUES   bike L8 x 6'    Calf raise Off step, 2x15 Bilat: bias L per arely   Ankle TB   Ankle inv/ev: blue 2x15 ea    Seated ankle TB Resume? Lateral band walk increase   Hip ext    Hip abd    Calf str W/towel for toe ext:  [id number]    Step up w/ bal 1 hand @ wall R,L ea   Slide lunge retro RESUME; cue for incr depth (goal: knee to floor)   Step down >LAT 6-8\"+ per arely    Walking lunge x30' Depth per arely        stairs >consider flights of stairs in future         Pt edu Need for incr knee ROM; ROM work options, cont calf strength/ tissue mobility work    Manual Intervention (83014)  Min:     Soft tissue Plantar fasica DTM; manua/ iastm (in prone              Ankle mobs Foot mobs: distraction, rearfoot inv/ev, navicular A/P/ distraction mobs              NMR re-education (90214)  Min:  CUES NEEDED   Balance >tandem?                    Therapeutic Activity (34843)  Min:     Pt edu Pathology, confounding factor of knee ROM deficit possibly contributing to ankle/ foot mobility deficiency    taping >future consider reverse 6 taping for orthotic trial             Modalities  Min:          DN ?                GAMEREADY:  GIRTH L R POST VASO   Mid Patella      5 cm above      Infra Patella      Mid Calf      Malleoli          Other Therapeutic Activities: Pt

## 2023-08-03 ENCOUNTER — HOSPITAL ENCOUNTER (OUTPATIENT)
Dept: PHYSICAL THERAPY | Age: 58
Setting detail: THERAPIES SERIES
Discharge: HOME | End: 2023-08-03
Payer: COMMERCIAL

## 2023-08-03 PROCEDURE — 97110 THERAPEUTIC EXERCISES: CPT

## 2023-08-03 PROCEDURE — 97140 MANUAL THERAPY 1/> REGIONS: CPT

## 2023-08-03 NOTE — FLOWSHEET NOTE
proximal hip and LE with self care and ADLs and functional mobility. [x] (98496) Gait Re-education- Provided training and instruction to the patient for proper LE, core and proximal hip recruitment and positioning and eccentric body weight control with ambulation re-education including up and down stairs     Home Exercise Program:    [x] (83401) Reviewed/Progressed HEP activities related to strengthening, flexibility, endurance, ROM of core, proximal hip and LE for functional self-care, mobility, lifting and ambulation/stair navigation   [x] (47783)Reviewed/Progressed HEP activities related to improving balance, coordination, kinesthetic sense, posture, motor skill, proprioception of core, proximal hip and LE for self care, mobility, lifting, and ambulation/stair navigation      Manual Treatments:  PROM / STM / Oscillations-Mobs:  G-I, II, III, IV (PA's, Inf., Post.)  [x] (37022) Provided manual therapy to mobilize LE, proximal hip and/or LS spine soft tissue/joints for the purpose of modulating pain, promoting relaxation,  increasing ROM, reducing/eliminating soft tissue swelling/inflammation/restriction, improving soft tissue extensibility and allowing for proper ROM for normal function with self care, mobility, lifting and ambulation. Modalities:  [] (79207) Vasopneumatic compression: Utilized vasopneumatic compression to decrease edema / swelling for the purpose of improving mobility and quad tone / recruitment which will allow for increased overall function including but not limited to self-care, transfers, ambulation, and ascending / descending stairs.          Approval Dates:  CPT Code Units Approved Units Used  Date Updated:                     Charges:  Timed Code Treatment Minutes: 40   Total Treatment Minutes: 40      [] EVAL (LOW) 42516 (typically 20 minutes face-to-face)  [] EVAL (MOD) 00827 (typically 30 minutes face-to-face)  [] EVAL (HIGH) 40297 (typically 45 minutes face-to-face)  [] RE-EVAL

## 2023-08-07 ENCOUNTER — APPOINTMENT (OUTPATIENT)
Dept: PHYSICAL THERAPY | Age: 58
End: 2023-08-07
Payer: COMMERCIAL

## 2023-08-10 ENCOUNTER — HOSPITAL ENCOUNTER (OUTPATIENT)
Dept: PHYSICAL THERAPY | Age: 58
Setting detail: THERAPIES SERIES
Discharge: HOME | End: 2023-08-10
Payer: COMMERCIAL

## 2023-08-10 PROCEDURE — 97110 THERAPEUTIC EXERCISES: CPT

## 2023-08-10 PROCEDURE — 97530 THERAPEUTIC ACTIVITIES: CPT

## 2023-08-10 PROCEDURE — 97140 MANUAL THERAPY 1/> REGIONS: CPT

## 2023-08-10 PROCEDURE — 20560 NDL INSJ W/O NJX 1 OR 2 MUSC: CPT

## 2023-08-10 NOTE — FLOWSHEET NOTE
Patient limited by pain  [] Patient limited by other medical complications  [] Other:     Overall Progression Towards Functional goals/ Treatment Progress Update:  [] Patient is progressing as expected towards functional goals listed. [] Progression is slowed due to complexities/Impairments listed. [] Progression has been slowed due to co-morbidities. [x] Plan just implemented, too soon to assess goals progression <30days   [] Goals require adjustment due to lack of progress  [] Patient is not progressing as expected and requires additional follow up with physician  [] Other    Prognosis for POC: [x] Good [] Fair  [] Poor    Patient requires continued skilled intervention: [x] Yes  [] No        PLAN:   [] Continue per plan of care [] Alter current plan (see comments)  [x] Plan of care initiated [] Hold pending MD visit [] Discharge    Electronically signed by: Micky [address] [place] [address]        Note: If patient does not return for scheduled/recommended follow up visits, this note will serve as a discharge from care along with the most recent update on progress.

## 2023-08-28 ENCOUNTER — HOSPITAL ENCOUNTER (OUTPATIENT)
Dept: PHYSICAL THERAPY | Age: 58
Setting detail: THERAPIES SERIES
Discharge: HOME | End: 2023-08-28
Payer: COMMERCIAL

## 2023-08-28 PROCEDURE — 97530 THERAPEUTIC ACTIVITIES: CPT

## 2023-08-28 PROCEDURE — 97140 MANUAL THERAPY 1/> REGIONS: CPT

## 2023-08-28 PROCEDURE — 97110 THERAPEUTIC EXERCISES: CPT

## 2023-08-28 NOTE — FLOWSHEET NOTE
[address] [address]  Phone: [phone]   Fax:     [phone]      Date:  2023    Patient Name:  Wendy Johnston    :  1965  MRN: [id number]    Pertinent Medical History:      Medical/Treatment Diagnosis Information:  Medical Diagnosis: Foot pain [M79.673]  Treatment Diagnosis: heel pain, limited mobility, function/ strength    Insurance/Certification information:     Physician Information:  Stephon Low DPM  Plan of care signed (Y/N):     Date of Patient follow up with Physician:      Progress Report: []  Yes  [x]  No     Date Range for reporting period:  Beginnin2023  Ending:     Progress report due (10 Rx/or 30 days whichever is less):      Recertification due (POC duration/ or 90 days whichever is less): 10/13/23     Visit # Insurance/POC Allowable Auth Needed   / (14 used) []Yes    []No       Latex Allergy:  [x]NO      []YES  Preferred Language for Healthcare:   [x]English       []Other:    Functional Scale:        Test: U [facility]  Date Assessed Score   8/10 2%           Pain level:  /10     History of Injury:Pt here for L heel pain which is chronic in nature. The problem has been there for several months, but worse over the past couple months. She recently had a knee replacement in early May and just finished up PT. 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 She points to posterior heel and watershed area of achilles as site of most pain. Pain at terminal stance w/ decreased push off. She has had a cortisone injection about 3 weeks ago and then tweaked her heel while walking and then the podiatrist gave her a round of Rx NSAIDS. She works out at [employer]. Goal: walk 3 miles (possibly a 5K this fall)       SUBJECTIVE:  Pt states foot conts to feel better.  She has cont'd w/ massage gun, and TB strength for inversion and

## 2023-09-11 ENCOUNTER — HOSPITAL ENCOUNTER (OUTPATIENT)
Dept: PHYSICAL THERAPY | Age: 58
Setting detail: THERAPIES SERIES
Discharge: HOME | End: 2023-09-11
Payer: COMMERCIAL

## 2023-09-11 PROCEDURE — 97530 THERAPEUTIC ACTIVITIES: CPT

## 2023-09-11 PROCEDURE — 97110 THERAPEUTIC EXERCISES: CPT

## 2023-09-11 NOTE — FLOWSHEET NOTE
[address] [address]  Phone: [phone]   Fax:     [phone]      Date:  2023    Patient Name:  Lorena Aleman    :  1965  MRN: [id number]    Pertinent Medical History:      Medical/Treatment Diagnosis Information:  Medical Diagnosis: Foot pain [M79.673]  Treatment Diagnosis: heel pain, limited mobility, function/ strength    Insurance/Certification information:     Physician Information:  Yeny Gamez DPM  Plan of care signed (Y/N):     Date of Patient follow up with Physician:      Progress Report: []  Yes  [x]  No     Date Range for reporting period:  Beginnin2023  Ending:     Progress report due (10 Rx/or 30 days whichever is less):      Recertification due (POC duration/ or 90 days whichever is less): 10/13/23     Visit # Insurance/POC Allowable Auth Needed   / (14 used) []Yes    []No       Latex Allergy:  [x]NO      []YES  Preferred Language for Healthcare:   [x]English       []Other:    Functional Scale:        Test: [facility]  Date Assessed Score   eval 13%   8/10 2%   9/11 2%       Pain level:  /10     History of Injury:Pt here for L heel pain which is chronic in nature. The problem has been there for several months, but worse over the past couple months. She recently had a knee replacement in early May and just finished up PT. 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 She points to posterior heel and watershed area of achilles as site of most pain. Pain at terminal stance w/ decreased push off. She has had a cortisone injection about 3 weeks ago and then tweaked her heel while walking and then the podiatrist gave her a round of Rx NSAIDS. She works out at [employer]. Goal: walk 3 miles (possibly a 5K this fall)       SUBJECTIVE:  Pt states foot conts to feel better.  She cont's w/ HEP and gets occasional soreness,

## 2023-09-21 ENCOUNTER — APPOINTMENT (OUTPATIENT)
Dept: PHYSICAL THERAPY | Age: 58
End: 2023-09-21
Payer: COMMERCIAL

## 2023-09-25 RX ORDER — AMOXICILLIN 500 MG/1
CAPSULE ORAL
Qty: 4 CAPSULE | Refills: 1 | Status: SHIPPED | OUTPATIENT
Start: 2023-09-25

## 2023-10-09 ENCOUNTER — HOSPITAL ENCOUNTER (OUTPATIENT)
Dept: WOMENS IMAGING | Age: 58
Discharge: HOME | End: 2023-10-09
Payer: COMMERCIAL

## 2023-10-09 VITALS — HEIGHT: 66 IN | BODY MASS INDEX: 37.61 KG/M2 | WEIGHT: 234 LBS

## 2023-10-09 DIAGNOSIS — Z12.31 BREAST CANCER SCREENING BY MAMMOGRAM: ICD-10-CM

## 2023-10-09 DIAGNOSIS — Z00.00 ANNUAL PHYSICAL EXAM: ICD-10-CM

## 2023-10-09 PROCEDURE — 77067 SCR MAMMO BI INCL CAD: CPT

## 2024-05-02 ENCOUNTER — OFFICE VISIT (OUTPATIENT)
Dept: ORTHOPEDIC SURGERY | Age: 59
End: 2024-05-02
Payer: COMMERCIAL

## 2024-05-02 DIAGNOSIS — Z96.652 STATUS POST TOTAL LEFT KNEE REPLACEMENT: Primary | ICD-10-CM

## 2024-05-02 PROCEDURE — 99213 OFFICE O/P EST LOW 20 MIN: CPT | Performed by: PHYSICIAN ASSISTANT

## 2024-05-04 NOTE — PROGRESS NOTES
This dictation was done with [employer] dictation and may contain mechanical errors related to translation.    I have today reviewed with Maxine Galdamez the clinically relevant, past medical history, medications, allergies, family history, social history, and Review Of Systems form the patient’s most recent history form & I have documented any details relevant to today's presenting complaints in my history below. Ms. Maxine Galdamez's self-reported past medical history, medications, allergies, family history, social history, and Review Of Systems form has been scanned into the chart under the \"Media\" tab.    Subjective:  Maxine Galdamez is a 58 y.o. who is here as an established patient having had a left total knee replacement done 1 year ago on 5/2/2023.  All in all she is doing extremely well she is happy with her knee.  She says she has 0 out of 10 pain she has good range of motion and well-healed incision.  I sent her for an AP lateral and sunrise view      Patient Active Problem List   Diagnosis    Plantar fasciitis of right foot    Plantar fascial fibromatosis    Presence of artificial knee joint, bilateral           Current Outpatient Medications on File Prior to Visit   Medication Sig Dispense Refill    amoxicillin (AMOXIL) 500 MG capsule TAKE 4 CAPSULES BY MOUTH 1 HOUR PRIOR TO DENTAL APPOINTMENT 4 capsule 1    DICLOFENAC PO Take by mouth 2 times daily      pantoprazole (PROTONIX) 40 MG tablet Take 1 tablet by mouth daily      tiZANidine (ZANAFLEX) 4 MG tablet Take 1 tablet by mouth 4 times daily as needed (muscles spasms) 40 tablet 0    aspirin EC 81 MG EC tablet Take 1 tablet by mouth 2 times daily for 14 days Please avoid missing doses. 28 tablet 0    cephALEXin (KEFLEX) 500 MG capsule Take 1 capsule by mouth See Admin Instructions Take one capsule at 9pm today and one capsule at 9am tomorrow (Patient not taking: Reported on 7/17/2023) 2 capsule 0    Cholecalciferol (VITAMIN D3) 50 MCG (2000 UT) CAPS Take

## 2024-07-03 ENCOUNTER — HOSPITAL ENCOUNTER (OUTPATIENT)
Dept: CT IMAGING | Age: 59
Discharge: HOME | End: 2024-07-03
Payer: COMMERCIAL

## 2024-07-03 DIAGNOSIS — R10.32 LLQ PAIN: ICD-10-CM

## 2024-07-03 PROCEDURE — 74176 CT ABD & PELVIS W/O CONTRAST: CPT

## 2024-07-17 NOTE — PROGRESS NOTES
[facility] ENDOSCOPY COLONOSCOPY PRE-OPERATIVE INSTRUCTIONS    Procedure date_07/23/2024________Arrival time__0630__________        Surgery time___0730_________       Clear liquids the day before the procedure. Do not eat or drink anything within 5 hours of your procedure.    This includes water chewing gum, mints and ice chips.   You may brush your teeth and gargle the morning of your surgery, but do not swallow the water    You may be asked to stop blood thinners such as Coumadin, Plavix, Fragmin, Lovenox, etc., or any anti-inflammatories such as:  Aspirin, Ibuprofen, Advil, Naproxen prior to your procedure.   We also ask that you stop any OTC medications such as fish oil, vitamin E, glucosamine, garlic, Multivitamins, COQ 10, etc.    You must make arrangements for a responsible adult to arrive with you and stay in our waiting area during your procedure.  They will also need to take you home after your procedure.    For your safety you will not be allowed to leave alone or drive yourself home.    Also for your safety, it is strongly suggested that someone stay with you the first 24 hours after your procedure.    For your comfort, please wear simple loose fitting clothing to the center.  Please do not bring valuables.      If you have a living will and a durable power of  for healthcare, please bring in a copy.     You will need to bring a photo ID and insurance card    Our goal is to provide you with excellent care so if you have any questions, please contact us at the [facility] at [phone]         Please note these are generalized instructions for all colonoscopy cases, you may be provided with more specific instructions if necessary

## 2024-07-22 ENCOUNTER — ANESTHESIA EVENT (OUTPATIENT)
Dept: ENDOSCOPY | Age: 59
End: 2024-07-22
Payer: COMMERCIAL

## 2024-07-23 ENCOUNTER — HOSPITAL ENCOUNTER (OUTPATIENT)
Age: 59
Setting detail: OUTPATIENT SURGERY
Discharge: HOME | End: 2024-07-23
Attending: INTERNAL MEDICINE | Admitting: INTERNAL MEDICINE
Payer: COMMERCIAL

## 2024-07-23 ENCOUNTER — ANESTHESIA (OUTPATIENT)
Dept: ENDOSCOPY | Age: 59
End: 2024-07-23
Payer: COMMERCIAL

## 2024-07-23 VITALS
BODY MASS INDEX: 38.83 KG/M2 | HEART RATE: 73 BPM | RESPIRATION RATE: 16 BRPM | SYSTOLIC BLOOD PRESSURE: 130 MMHG | HEIGHT: 66 IN | OXYGEN SATURATION: 100 % | DIASTOLIC BLOOD PRESSURE: 85 MMHG | WEIGHT: 241.6 LBS | TEMPERATURE: 96 F

## 2024-07-23 PROCEDURE — 3700000001 HC ADD 15 MINUTES (ANESTHESIA): Performed by: INTERNAL MEDICINE

## 2024-07-23 PROCEDURE — 7100000011 HC PHASE II RECOVERY - ADDTL 15 MIN: Performed by: INTERNAL MEDICINE

## 2024-07-23 PROCEDURE — 7100000010 HC PHASE II RECOVERY - FIRST 15 MIN: Performed by: INTERNAL MEDICINE

## 2024-07-23 PROCEDURE — 6360000002 HC RX W HCPCS

## 2024-07-23 PROCEDURE — 2580000003 HC RX 258: Performed by: ANESTHESIOLOGY

## 2024-07-23 PROCEDURE — 3609027000 HC COLONOSCOPY: Performed by: INTERNAL MEDICINE

## 2024-07-23 PROCEDURE — 3700000000 HC ANESTHESIA ATTENDED CARE: Performed by: INTERNAL MEDICINE

## 2024-07-23 PROCEDURE — 2500000003 HC RX 250 WO HCPCS

## 2024-07-23 RX ORDER — SODIUM CHLORIDE 0.9 % (FLUSH) 0.9 %
5-40 SYRINGE (ML) INJECTION PRN
Status: DISCONTINUED | OUTPATIENT
Start: 2024-07-23 | End: 2024-07-23 | Stop reason: HOSPADM

## 2024-07-23 RX ORDER — SODIUM CHLORIDE 0.9 % (FLUSH) 0.9 %
5-40 SYRINGE (ML) INJECTION PRN
Status: CANCELLED | OUTPATIENT
Start: 2024-07-23

## 2024-07-23 RX ORDER — DIPHENHYDRAMINE HYDROCHLORIDE 50 MG/ML
12.5 INJECTION INTRAMUSCULAR; INTRAVENOUS
Status: CANCELLED | OUTPATIENT
Start: 2024-07-23 | End: 2024-07-24

## 2024-07-23 RX ORDER — LIDOCAINE HYDROCHLORIDE 20 MG/ML
INJECTION, SOLUTION EPIDURAL; INFILTRATION; INTRACAUDAL; PERINEURAL PRN
Status: DISCONTINUED | OUTPATIENT
Start: 2024-07-23 | End: 2024-07-23 | Stop reason: SDUPTHER

## 2024-07-23 RX ORDER — SODIUM CHLORIDE 0.9 % (FLUSH) 0.9 %
5-40 SYRINGE (ML) INJECTION EVERY 12 HOURS SCHEDULED
Status: DISCONTINUED | OUTPATIENT
Start: 2024-07-23 | End: 2024-07-23 | Stop reason: HOSPADM

## 2024-07-23 RX ORDER — SODIUM CHLORIDE 9 MG/ML
INJECTION, SOLUTION INTRAVENOUS PRN
Status: DISCONTINUED | OUTPATIENT
Start: 2024-07-23 | End: 2024-07-23 | Stop reason: HOSPADM

## 2024-07-23 RX ORDER — SODIUM CHLORIDE 0.9 % (FLUSH) 0.9 %
5-40 SYRINGE (ML) INJECTION EVERY 12 HOURS SCHEDULED
Status: CANCELLED | OUTPATIENT
Start: 2024-07-23

## 2024-07-23 RX ORDER — LABETALOL HYDROCHLORIDE 5 MG/ML
5 INJECTION, SOLUTION INTRAVENOUS
Status: CANCELLED | OUTPATIENT
Start: 2024-07-23

## 2024-07-23 RX ORDER — PROPOFOL 10 MG/ML
INJECTION, EMULSION INTRAVENOUS PRN
Status: DISCONTINUED | OUTPATIENT
Start: 2024-07-23 | End: 2024-07-23 | Stop reason: SDUPTHER

## 2024-07-23 RX ORDER — MEPERIDINE HYDROCHLORIDE 25 MG/ML
12.5 INJECTION INTRAMUSCULAR; INTRAVENOUS; SUBCUTANEOUS EVERY 5 MIN PRN
Status: CANCELLED | OUTPATIENT
Start: 2024-07-23

## 2024-07-23 RX ORDER — NALOXONE HYDROCHLORIDE 0.4 MG/ML
INJECTION, SOLUTION INTRAMUSCULAR; INTRAVENOUS; SUBCUTANEOUS PRN
Status: CANCELLED | OUTPATIENT
Start: 2024-07-23

## 2024-07-23 RX ORDER — ONDANSETRON 2 MG/ML
4 INJECTION INTRAMUSCULAR; INTRAVENOUS
Status: CANCELLED | OUTPATIENT
Start: 2024-07-23 | End: 2024-07-24

## 2024-07-23 RX ORDER — FENTANYL CITRATE 50 UG/ML
25 INJECTION, SOLUTION INTRAMUSCULAR; INTRAVENOUS EVERY 5 MIN PRN
Status: CANCELLED | OUTPATIENT
Start: 2024-07-23

## 2024-07-23 RX ORDER — SODIUM CHLORIDE 9 MG/ML
INJECTION, SOLUTION INTRAVENOUS PRN
Status: CANCELLED | OUTPATIENT
Start: 2024-07-23

## 2024-07-23 RX ADMIN — PROPOFOL 90 MG: 10 INJECTION, EMULSION INTRAVENOUS at 07:50

## 2024-07-23 RX ADMIN — LIDOCAINE HYDROCHLORIDE 50 MG: 20 INJECTION, SOLUTION EPIDURAL; INFILTRATION; INTRACAUDAL; PERINEURAL at 07:50

## 2024-07-23 RX ADMIN — SODIUM CHLORIDE: 9 INJECTION, SOLUTION INTRAVENOUS at 07:07

## 2024-07-23 RX ADMIN — PROPOFOL 150 MCG/KG/MIN: 10 INJECTION, EMULSION INTRAVENOUS at 07:51

## 2024-07-23 ASSESSMENT — ENCOUNTER SYMPTOMS: SHORTNESS OF BREATH: 0

## 2024-07-23 ASSESSMENT — LIFESTYLE VARIABLES: SMOKING_STATUS: 0

## 2024-07-23 NOTE — ANESTHESIA POSTPROCEDURE EVALUATION
Department of Anesthesiology  Postprocedure Note    Patient: Maxine Galdamez  MRN: [id number]  YOB: 1965  Date of evaluation: 7/23/2024    Procedure Summary       Date: 07/23/24 Room / Location: [address]    Anesthesia Start: 0747 Anesthesia Stop: 0810    Procedure: COLORECTAL CANCER SCREENING, NOT HIGH RISK Diagnosis:       Screen for colon cancer      (Screen for colon cancer [Z12.11])    Surgeons: Jagdeep Magana MD Responsible Provider: Chayito Seymour MD    Anesthesia Type: MAC ASA Status: 3            Anesthesia Type: No value filed.    Fawad Phase I: Fawad Score: 10    Fawad Phase II: Fawad Score: 10    Anesthesia Post Evaluation    Patient location during evaluation: PACU  Patient participation: complete - patient participated  Level of consciousness: awake  Pain score: 0  Airway patency: patent  Nausea & Vomiting: no nausea  Cardiovascular status: hemodynamically stable  Respiratory status: acceptable  Hydration status: euvolemic  Multimodal analgesia pain management approach    No notable events documented.

## 2024-07-23 NOTE — ANESTHESIA PRE PROCEDURE
Department of Anesthesiology  Preprocedure Note       Name:  Maxine Galdamez   Age:  59 y.o.  :  1965                                          MRN:  [id number]         Date:  2024      Surgeon: Surgeon(s):  Jagdeep Magana MD    Procedure: Procedure(s):  COLORECTAL CANCER SCREENING, NOT HIGH RISK    Medications prior to admission:   Prior to Admission medications    Medication Sig Start Date End Date Taking? Authorizing Provider   polyethylene glycol (GLYCOLAX) 17 GM/SCOOP powder Take 17 g by mouth daily 24  Dori Earl PA   amoxicillin (AMOXIL) 500 MG capsule TAKE 4 CAPSULES BY MOUTH 1 HOUR PRIOR TO DENTAL APPOINTMENT  Patient not taking: Reported on 2024   Kai Huerta PA   DICLOFENAC PO Take by mouth 2 times daily    ProviderCari MD   aspirin EC 81 MG EC tablet Take 1 tablet by mouth 2 times daily for 14 days Please avoid missing doses. 23  Alayna Canada, APRN - CNP   estradiol (ESTRACE) 1 MG tablet Take 1 tablet by mouth daily    Provider, MD Cari       Current medications:    Current Facility-Administered Medications   Medication Dose Route Frequency Provider Last Rate Last Admin   • sodium chloride flush 0.9 % injection 5-40 mL  5-40 mL IntraVENous 2 times per day Celso Bass MD       • sodium chloride flush 0.9 % injection 5-40 mL  5-40 mL IntraVENous PRN Celso Bass MD       • 0.9 % sodium chloride infusion   IntraVENous PRN Celso Bass MD 50 mL/hr at 24 0707 New Bag at 24 0707       Allergies:  No Known Allergies    Problem List:    Patient Active Problem List   Diagnosis Code   • Plantar fasciitis of right foot M72.2   • Plantar fascial fibromatosis M72.2   • Presence of artificial knee joint, bilateral Z96.653       Past Medical History:        Diagnosis Date   • Arthritis     knee   • Depression 2019   • GERD (gastroesophageal reflux disease)     mild   • Prolonged emergence from general anesthesia

## 2024-07-23 NOTE — OP NOTE
COLONOSCOPY     Patient: Maxine Galdamez MRN: [id number]   YOB: 1965 Age: 59 y.o. Sex: female       Admitting Physician: JAGDEEP HALL     Primary Care Physician: Dori Earl PA      DATE OF PROCEDURE: 7/23/2024  PROCEDURE: Colonoscopy    PREOPERATIVE DIAGNOSIS: Screen for colon cancer [Z12.11]  HPI: This is a 59 y.o. year old female who presents today for colon cancer screening and screening colonoscopy.    ENDOSCOPIST: Jagdeep Hall MD    POSTOPERATIVE DIAGNOSIS:    1.  Sigmoid diverticulosis    PLAN:   1.  Screening colonoscopy in 10 years    INFORMED CONSENT:  Informed consent for colonoscopy was obtained.  The benefits and risks including adverse medicine reaction and perforation have been explained.  The patient's questions were answered and the patient agreed to proceed.    ASA: ASA 2 - Patient with mild systemic disease with no functional limitations     SEDATION: MAC    The patient's vital signs, cardiac status, pulmonary status, abdominal status and mental status were stable for the procedure. The patient's vital signs and respiratory function as monitored by oxygen saturation remained stable.    COLON PREPARATION:  The patient was given a split colon preparation and the preparation was adequate.    Procedure Details:    An anal exam was performed and this was unremarkable. A digital rectal exam was performed and no masses palpated. The Olympus videocolonoscope  was inserted in the rectum and carefully advanced to the cecum as identified by IC valve, crow's foot appearance and appendix. The cecum was photodocumented.  The colonoscope was slowly withdrawn and retrograde examination of the colon was carefully performed with inspection around and between folds. The ascending colon and cecum were intubated twice with repeat antegrade and retrograde examination.  Retroflexion was performed in the right colon.  Retroflexion in the rectum was performed.   Cecum Intubated:

## 2024-07-23 NOTE — H&P
Exercise Vital Sign     Days of Exercise per Week: 3 days     Minutes of Exercise per Session: 60 min   Intimate Partner Violence: Not At Risk (2/22/2023)    Humiliation, Afraid, Rape, and Kick questionnaire     Fear of Current or Ex-Partner: No     Emotionally Abused: No     Physically Abused: No     Sexually Abused: No   Housing Stability: Unknown (7/17/2023)    Housing Stability Vital Sign     Unstable Housing in the Last Year: No      Family History   Problem Relation Age of Onset    Heart Disease Mother     High Blood Pressure Mother     High Cholesterol Mother     Stroke Mother     Cancer Father         Breast?    High Blood Pressure Father     Diabetes Father     Heart Disease Father     Retinal Detachment Father     Abdominal aortic aneurysm Father     High Blood Pressure Brother     Cancer Other         Bone cancer     Allergies: No Known Allergies  Medications:   Prior to Admission medications    Medication Sig Start Date End Date Taking? Authorizing Provider   polyethylene glycol (GLYCOLAX) 17 GM/SCOOP powder Take 17 g by mouth daily 5/31/24 7/30/24  Dori Earl PA   amoxicillin (AMOXIL) 500 MG capsule TAKE 4 CAPSULES BY MOUTH 1 HOUR PRIOR TO DENTAL APPOINTMENT  Patient not taking: Reported on 7/23/2024 9/25/23   Kai Huerta PA   DICLOFENAC PO Take by mouth 2 times daily    ProviderCari MD   aspirin EC 81 MG EC tablet Take 1 tablet by mouth 2 times daily for 14 days Please avoid missing doses. 5/2/23 5/16/23  Alayna Canada APRN - CNP   estradiol (ESTRACE) 1 MG tablet Take 1 tablet by mouth daily    Provider, MD Cari       Vital Signs: /79   Pulse 96   Temp (!) 96.1 °F (35.6 °C) (Temporal)   Resp 14   Ht 1.676 m (5' 6\")   Wt 109.6 kg (241 lb 9.6 oz)   SpO2 98%   BMI 39.00 kg/m²   Physical Exam:   Heart: regular   Lungs: non-labored breathing  Mental status:  Alert and oriented    ASA score:  ASA 2 - Patient with mild systemic disease with no functional

## 2024-08-20 LAB
CHOLEST SERPL-MCNC: 150 MG/DL (ref 0–199)
GLUCOSE SERPL-MCNC: 87 MG/DL (ref 70–99)
HDLC SERPL-MCNC: 53 MG/DL (ref 40–60)
LDLC SERPL CALC-MCNC: 85 MG/DL
TRIGL SERPL-MCNC: 58 MG/DL (ref 0–150)

## 2024-09-16 RX ORDER — AMOXICILLIN 500 MG/1
CAPSULE ORAL
Qty: 4 CAPSULE | Refills: 1 | Status: SHIPPED | OUTPATIENT
Start: 2024-09-16

## 2024-10-16 ENCOUNTER — HOSPITAL ENCOUNTER (OUTPATIENT)
Dept: MAMMOGRAPHY | Age: 59
Discharge: HOME | End: 2024-10-16
Payer: COMMERCIAL

## 2024-10-16 VITALS — WEIGHT: 244 LBS | BODY MASS INDEX: 39.21 KG/M2 | HEIGHT: 66 IN

## 2024-10-16 DIAGNOSIS — Z12.31 VISIT FOR SCREENING MAMMOGRAM: ICD-10-CM

## 2024-10-16 PROCEDURE — 77063 BREAST TOMOSYNTHESIS BI: CPT

## 2024-10-28 RX ORDER — AMOXICILLIN 500 MG/1
CAPSULE ORAL
Qty: 4 CAPSULE | Refills: 1 | Status: SHIPPED | OUTPATIENT
Start: 2024-10-28

## 2025-01-27 SDOH — HEALTH STABILITY: PHYSICAL HEALTH: ON AVERAGE, HOW MANY DAYS PER WEEK DO YOU ENGAGE IN MODERATE TO STRENUOUS EXERCISE (LIKE A BRISK WALK)?: 2 DAYS

## 2025-01-27 SDOH — HEALTH STABILITY: PHYSICAL HEALTH: ON AVERAGE, HOW MANY MINUTES DO YOU ENGAGE IN EXERCISE AT THIS LEVEL?: 30 MIN

## 2025-02-17 SDOH — HEALTH STABILITY: PHYSICAL HEALTH: ON AVERAGE, HOW MANY MINUTES DO YOU ENGAGE IN EXERCISE AT THIS LEVEL?: 30 MIN

## 2025-02-17 SDOH — HEALTH STABILITY: PHYSICAL HEALTH: ON AVERAGE, HOW MANY DAYS PER WEEK DO YOU ENGAGE IN MODERATE TO STRENUOUS EXERCISE (LIKE A BRISK WALK)?: 2 DAYS

## 2025-02-20 ENCOUNTER — OFFICE VISIT (OUTPATIENT)
Dept: ORTHOPEDIC SURGERY | Age: 60
End: 2025-02-20

## 2025-02-20 VITALS
OXYGEN SATURATION: 98 % | WEIGHT: 247 LBS | RESPIRATION RATE: 18 BRPM | HEIGHT: 66 IN | BODY MASS INDEX: 39.7 KG/M2 | HEART RATE: 72 BPM

## 2025-02-20 DIAGNOSIS — M25.871 SUBFIBULAR IMPINGEMENT OF RIGHT LOWER EXTREMITY: ICD-10-CM

## 2025-02-20 DIAGNOSIS — M79.671 FOOT PAIN, RIGHT: Primary | ICD-10-CM

## 2025-02-20 DIAGNOSIS — M24.573 EQUINUS CONTRACTURE OF ANKLE: ICD-10-CM

## 2025-02-20 DIAGNOSIS — M21.41 PES PLANUS OF RIGHT FOOT: ICD-10-CM

## 2025-02-20 NOTE — PROGRESS NOTES
dislocation, or radioopaque foreign body/tumor. The ankle mortise is maintained with no widening of the clear spaces.  Meary's angle is apex plantar, with widened talocalcaneal angle and talar head uncoverage.    IMPRESSION: No acute fracture/dislocation. Pes planus.      Electronically signed by Feliz Woody MD    Relevant previous imaging reviewed, both imaging and report(s) as below:    No results found.       ASSESSMENT AND PLAN:  Body mass index is 39.87 kg/m².  Assessment & Plan     She has right foot pain, secondary to posterior tibial tendon insufficiency with underlying pes planus and equinus, as well as subfibular impingement.       Notably, she has the past medical history as above.  She has a history of GERD.      We had a discussion today about the likely diagnosis and its natural history, physical exam and imaging findings, as well as various treatment options in detail.    Surgically, we discussed a possible future right flatfoot reconstruction, depending on the patient's clinical course.  Prior to the patient's initial office visit, the patient has tried orthotics and a cam boot.  At today's visit, we did decide to proceed with conservative management.     Orders/referrals were placed as below at today's visit.     She will continue to use her custom orthotic provided by her podiatrist.  I referred the patient to physical therapy for my flatfoot protocol. I provided a prescription for Voltaren (4g TOPL q QID PRN pain); I have recommended this over the use of oral NSAIDs because of the patient's history of GERD/GI ulcer.      All questions were answered and the above plan was agreed upon. The patient will return to clinic in 3 months PRN with bilateral Itz view x-rays.  In the future, may consider a right subtalar joint injection.            At the patient's next visit, depending on how the patient is doing and/or new imaging/labs results, we may consider the following options:    []  Lace

## 2025-02-25 ENCOUNTER — HOSPITAL ENCOUNTER (OUTPATIENT)
Dept: PHYSICAL THERAPY | Age: 60
Setting detail: THERAPIES SERIES
Discharge: HOME | End: 2025-02-25
Payer: COMMERCIAL

## 2025-02-25 PROCEDURE — 97110 THERAPEUTIC EXERCISES: CPT

## 2025-02-25 PROCEDURE — 97161 PT EVAL LOW COMPLEX 20 MIN: CPT

## 2025-02-25 NOTE — PLAN OF CARE
[facility] - Outpatient Rehabilitation and Therapy: [address], [address], [place] [address] office: [phone] fax: [phone]     Physical Therapy Initial Evaluation Certification      Dear Feliz Woody MD ,    We had the pleasure of evaluating the following patient for physical therapy services at [facility].  A summary of our findings can be found in the initial assessment below.  This includes our plan of care.  If you have any questions or concerns regarding these findings, please do not hesitate to contact me at the office phone number listed above.  Thank you for the referral.     Physician Signature:_______________________________Date:__________________  By signing above (or electronic signature), therapist’s plan is approved by physician       Physical Therapy: TREATMENT/PROGRESS NOTE   Patient: Maxine Galdamez (59 y.o. female)   Examination Date: 2025   :  1965 MRN: [id number]   Visit #: 1   Insurance Allowable Auth Needed   Yes []Yes    [x]No    Insurance: Payor: R / Plan: [employer] EM[employer]YEES / Product Type: *No Product type* /   Insurance ID: [id number] - (Commercial)  Secondary Insurance (if applicable):    Treatment Diagnosis: R foot pain M79.671   Medical Diagnosis:  Foot pain, right [M79.671]  Pes planus of right foot [M21.41]  Equinus contracture of ankle [M24.573]  Subfibular impingement of right lower extremity [M25.871]   Referring Physician: Feliz Woody MD  PCP: Dori Earl PA     Plan of care signed (Y/N):     Date of Patient follow up with Physician:      Plan of Care Report: EVAL today  POC update due: (10 visits /OR AUTH LIMITS, whichever is less)  3/27/2025                                            Medical History:  Comorbidities:  Osteoarthritis  Relevant Medical History: Bilat TKA, previous instances of bilat plantar fasciitis                                          Precautions/

## 2025-03-05 ENCOUNTER — HOSPITAL ENCOUNTER (OUTPATIENT)
Dept: PHYSICAL THERAPY | Age: 60
Setting detail: THERAPIES SERIES
Discharge: HOME | End: 2025-03-05
Payer: COMMERCIAL

## 2025-03-05 PROCEDURE — 97140 MANUAL THERAPY 1/> REGIONS: CPT | Performed by: SPECIALIST/TECHNOLOGIST

## 2025-03-05 PROCEDURE — 97110 THERAPEUTIC EXERCISES: CPT | Performed by: SPECIALIST/TECHNOLOGIST

## 2025-03-05 NOTE — FLOWSHEET NOTE
of 18% or less for the FAAM to assist with reaching prior level of function with activities such as walking, stair ambulation, working out.  [] Progressing: [] Met: [x] Not Met: [] Adjusted  Patient will demonstrate increased AROM of R ankle DF, PF, INV, and EV to WNL without pain to allow for proper joint functioning to enable patient to return to recreational exercise.   [] Progressing: [] Met: [x] Not Met: [] Adjusted  Patient will demonstrate increased Strength of Global R ankle to at least 5/5 throughout without pain to allow for proper functional mobility to enable patient to return to walking for exercise.   [] Progressing: [] Met: [x] Not Met: [] Adjusted  Patient will return to sleeping without increased symptoms or restriction.   [] Progressing: [] Met: [x] Not Met: [] Adjusted      Overall Progression Towards Functional goals/ Treatment Progress Update:  [] Patient is progressing as expected towards functional goals listed.    [] Progression is slowed due to complexities/Impairments listed.  [] Progression has been slowed due to co-morbidities.  [x] Plan just implemented, too soon (<30days) to assess goals progression   [] Goals require adjustment due to lack of progress  [] Patient is not progressing as expected and requires additional follow up with physician  [] Other:     TREATMENT PLAN     Frequency/Duration: 1x/week for 4-6 weeks for the following treatment interventions:    Interventions:  Therapeutic Exercise (88615) including: strength training, ROM, and functional mobility  Therapeutic Activities (89739) including: functional mobility training and education.  Neuromuscular Re-education (12371) activation and proprioception, including postural re-education.    Gait Training (43248) for normalization of ambulation patterns and AD training.   Manual Therapy (87708) as indicated to include: Passive Range of Motion, Gr I-IV mobilizations, Soft Tissue Mobilization, Dry Needling/IASTM, Trigger Point

## 2025-03-11 ENCOUNTER — HOSPITAL ENCOUNTER (OUTPATIENT)
Dept: PHYSICAL THERAPY | Age: 60
Setting detail: THERAPIES SERIES
Discharge: HOME | End: 2025-03-11
Payer: COMMERCIAL

## 2025-03-11 PROCEDURE — 97110 THERAPEUTIC EXERCISES: CPT

## 2025-03-11 PROCEDURE — 97140 MANUAL THERAPY 1/> REGIONS: CPT

## 2025-03-11 NOTE — FLOWSHEET NOTE
[facility] - Outpatient Rehabilitation and Therapy: [address], [place] [address] office: [phone] fax: [phone]        Physical Therapy: TREATMENT/PROGRESS NOTE   Patient: Maxine Galdamez (59 y.o. female)   Examination Date: 2025   :  1965 MRN: [id number]   Visit #: 3   Insurance Allowable Auth Needed   Yes []Yes    [x]No    Insurance: Payor: [facility] / Plan: [employer] EMPLOYEES / Product Type: *No Product type* /   Insurance ID: [id number] - (Commercial)  Secondary Insurance (if applicable):    Treatment Diagnosis: R foot pain M79.671   Medical Diagnosis:  Foot pain, right [M79.671]  Pes planus of right foot [M21.41]  Equinus contracture of ankle [M24.573]  Subfibular impingement of right lower extremity [M25.871]   Referring Physician: Feliz Woody MD  PCP: Dori Earl PA     Plan of care signed (Y/N):     Date of Patient follow up with Physician:      Plan of Care Report: NO  POC update due: (10 visits /OR AUTH LIMITS, whichever is less)  3/27/2025                                            Medical History:  Comorbidities:  Osteoarthritis  Relevant Medical History: Bilat TKA, previous instances of bilat plantar fasciitis                                          Precautions/ Contra-indications:           Latex allergy:  NO  Pacemaker:    NO  Contraindications for Manipulation: None  Date of Surgery: N/A  Other:    Red Flags:  Fevers, chills, night sweats    Suicide Screening:   The patient did not verbalize a primary behavioral concern, suicidal ideation, suicidal intent, or demonstrate suicidal behaviors.    Preferred Language for Healthcare:   [x] English       [] other:    SUBJECTIVE EXAMINATION     Patient stated complaint: Pt reports that her foot feels ok today, and it still mostly bothers her at night.     R foot pain that has been getting worse since December. Pt states that she started to notice an increase in pain at the inside of her R

## 2025-03-18 ENCOUNTER — HOSPITAL ENCOUNTER (OUTPATIENT)
Dept: PHYSICAL THERAPY | Age: 60
Setting detail: THERAPIES SERIES
Discharge: HOME | End: 2025-03-18
Payer: COMMERCIAL

## 2025-03-18 PROCEDURE — 97140 MANUAL THERAPY 1/> REGIONS: CPT

## 2025-03-18 PROCEDURE — 97110 THERAPEUTIC EXERCISES: CPT

## 2025-03-18 NOTE — FLOWSHEET NOTE
KNEE  Flexion        Extension        ANKLE  DF 5/5 4+/5      PF 5/5 4+/5      Inversion 5/5 4/5      Eversion 5/5 4/5      Repeated Movements: [x] Normal  [] Abnormal [] N/A    Palpation:   Patient reported tenderness with palpation  Location:R peroneal tendons     Posture:   forward head, rounded shoulders, and pes planus Bilateral    Bandages/Dressings/Incisions:  Not Applicable    Dermatomes: Abnormal findings listed below  All WNL    Myotomes: Abnormal findings listed below  All WNL    Reflexes: Abnormal findings listed below  Not Tested    Specific Joint Mobility Testing/Accessory Motions:      Foot/Ankle:  hypomobile on R    Special Tests:  Anterior Drawer Right negative  Lateral Stability Right negative  Medial Stability Right negative     Gait:    Pattern: decreased stance time on right  Assistive Device Used: no AD    Balance:  [x] WNL      [] NT       [] Dysfunction noted  Comment:     Falls Risk Assessment (30 days):   Falls Risk assessed and no intervention required.  Time Up and Go (TUG):   Not Assessed        Exercises/Interventions     Therapeutic Ex (62300)  Sets/time Notes/Cues/Progressions   3x10 GTB   Seated ankle Inversion  3x10 GTB   Long sitting calf / soleus stretch  3x30\" ea     Seated towel scrunches  3x10    Seated arch doming  3x10    Seated ball rolls 2'    Seated toe spreading  3x10    Standing heel raises  3x10    Seated great toe ext  3x10          Pt education: POC, HEP, expected outcomes, examination findings  5'    Manual Intervention (55109) TIME    STM plantar fas., posterior tib 15'                        NMR re-education (57517) Sets/time CUES NEEDED                            Therapeutic Activity (45601) Sets/time                               Modalities:    No modalities applied this session    Education/Home Exercise Program: Patient HEP program created electronically.  Refer to [employer] access code:    Access Code: [email or web address]  URL: [email or web address]  Date:

## 2025-03-25 ENCOUNTER — HOSPITAL ENCOUNTER (OUTPATIENT)
Dept: PHYSICAL THERAPY | Age: 60
Setting detail: THERAPIES SERIES
Discharge: HOME | End: 2025-03-25
Payer: COMMERCIAL

## 2025-03-25 PROCEDURE — 97140 MANUAL THERAPY 1/> REGIONS: CPT

## 2025-03-25 PROCEDURE — 97110 THERAPEUTIC EXERCISES: CPT

## 2025-03-25 NOTE — FLOWSHEET NOTE
Adjusted    Therapist goals for Patient:   Short Term Goals: To be achieved in: 2 weeks  1Independent in HEP and progression per patient tolerance, in order to prevent re-injury.   [] Progressing: [x] Met: [] Not Met: [] Adjusted  Patient will have a decrease in pain to <4/10 to facilitate improvement in movement, function, and ADLs as indicated by Functional Deficits.  [x] Progressing: [] Met: [] Not Met: [] Adjusted    IF APPLICABLE:  [] Patient to demonstrate independence in wear and care for custom orthotic device. (Only if applicable for orthotic eval)     Long Term Goals: To be achieved in: 4-6 weeks  Disability index score of 18% or less for the FAAM to assist with reaching prior level of function with activities such as walking, stair ambulation, working out.  [] Progressing: [x] Met: [] Not Met: [] Adjusted  Patient will demonstrate increased AROM of R ankle DF, PF, INV, and EV to WNL without pain to allow for proper joint functioning to enable patient to return to recreational exercise.   [] Progressing: [x] Met: [] Not Met: [] Adjusted  Patient will demonstrate increased Strength of Global R ankle to at least 5/5 throughout without pain to allow for proper functional mobility to enable patient to return to walking for exercise.   [x] Progressing: [] Met: [] Not Met: [] Adjusted  Patient will return to sleeping without increased symptoms or restriction.   [] Progressing: [x] Met: [] Not Met: [] Adjusted      Overall Progression Towards Functional goals/ Treatment Progress Update:  [x] Patient is progressing as expected towards functional goals listed.    [] Progression is slowed due to complexities/Impairments listed.  [] Progression has been slowed due to co-morbidities.  [] Plan just implemented, too soon (<30days) to assess goals progression   [] Goals require adjustment due to lack of progress  [] Patient is not progressing as expected and requires additional follow up with physician  [] Other:

## 2025-04-15 ENCOUNTER — APPOINTMENT (OUTPATIENT)
Dept: PHYSICAL THERAPY | Age: 60
End: 2025-04-15
Payer: COMMERCIAL

## 2025-04-22 ENCOUNTER — HOSPITAL ENCOUNTER (OUTPATIENT)
Dept: PHYSICAL THERAPY | Age: 60
Setting detail: THERAPIES SERIES
Discharge: HOME | End: 2025-04-22
Payer: COMMERCIAL

## 2025-04-22 PROCEDURE — 97110 THERAPEUTIC EXERCISES: CPT

## 2025-04-22 PROCEDURE — 97140 MANUAL THERAPY 1/> REGIONS: CPT

## 2025-04-22 NOTE — FLOWSHEET NOTE
[facility] - Outpatient Rehabilitation and Therapy: [address], [place] [address] office: [phone] fax: [phone]        Physical Therapy: TREATMENT/PROGRESS NOTE   Patient: Maxine Galdamez (59 y.o. female)   Examination Date: 2025   :  1965 MRN: [id number]   Visit #: 6   Insurance Allowable Auth Needed   Yes []Yes    [x]No    Insurance: Payor: [facility] / Plan: [employer] EMPLOYEES / Product Type: *No Product type* /   Insurance ID: [id number] - (Commercial)  Secondary Insurance (if applicable):    Treatment Diagnosis: R foot pain M79.671   Medical Diagnosis:  Foot pain, right [M79.671]  Pes planus of right foot [M21.41]  Equinus contracture of ankle [M24.573]  Subfibular impingement of right lower extremity [M25.871]   Referring Physician: Feliz Woody MD  PCP: Dori Earl PA     Plan of care signed (Y/N):     Date of Patient follow up with Physician:      Plan of Care Report: NO  POC update due: (10 visits /OR AUTH LIMITS, whichever is less)  3/27/2025                                            Medical History:  Comorbidities:  Osteoarthritis  Relevant Medical History: Bilat TKA, previous instances of bilat plantar fasciitis                                          Precautions/ Contra-indications:           Latex allergy:  NO  Pacemaker:    NO  Contraindications for Manipulation: None  Date of Surgery: N/A  Other:    Red Flags:  Fevers, chills, night sweats    Suicide Screening:   The patient did not verbalize a primary behavioral concern, suicidal ideation, suicidal intent, or demonstrate suicidal behaviors.    Preferred Language for Healthcare:   [x] English       [] other:    SUBJECTIVE EXAMINATION     Patient stated complaint: Pt reports that the inside of her foot and ankle continue to feel better, and she has noticed some increased irritation in the outer part of her ankle after she started wearing a new pair of shoes.     R foot pain that has been

## 2025-06-09 RX ORDER — AMOXICILLIN 500 MG/1
CAPSULE ORAL
Qty: 4 CAPSULE | Refills: 1 | OUTPATIENT
Start: 2025-06-09

## 2025-06-09 NOTE — TELEPHONE ENCOUNTER
I called the patient and told her antibiotics are no longer required for dental procedures.  The patient was fine with no taking the antibiotics

## 2025-06-26 ENCOUNTER — OFFICE VISIT (OUTPATIENT)
Dept: ORTHOPEDIC SURGERY | Age: 60
End: 2025-06-26
Payer: COMMERCIAL

## 2025-06-26 VITALS — HEIGHT: 65 IN | WEIGHT: 245 LBS | BODY MASS INDEX: 40.82 KG/M2

## 2025-06-26 DIAGNOSIS — M47.27 MULTILEVEL LUMBOSACRAL SPONDYLOSIS WITH RADICULOPATHY: Primary | ICD-10-CM

## 2025-06-26 PROCEDURE — 99214 OFFICE O/P EST MOD 30 MIN: CPT | Performed by: PHYSICIAN ASSISTANT

## 2025-06-26 RX ORDER — MELOXICAM 15 MG/1
15 TABLET ORAL DAILY
Qty: 30 TABLET | Refills: 1 | Status: SHIPPED | OUTPATIENT
Start: 2025-06-26

## 2025-06-26 NOTE — PROGRESS NOTES
History of present illness:   Ms. Maxine Galdamez is a pleasant 60 y.o. female kindly referred by Dori Earl PA-C for consultation regarding her LBP and recent onset of left lateral lower extremity pain.   Her pain began gradually this Spring. No injury. May be related to being immobilized in a tall boot in December.  Pain has steadily worsened since onset.   Back pain 5-6/10 VAS, left buttock pain 5-6/10 VAS, left leg pain 2/10 VAS.   Pain is describes as ache with episodes of sharp pain.   She denies numbness and tingling into her lower extremities.    She denies weakness of her right and left leg.  She denies bowel or bladder dysfunction and saddle anesthesia.   She can sit for a maximum of unlimited minutes depending on the chair and stand for a maximum unlimited minutes.   Pain does occasionally disrupt her sleep.   Prior medications and treatment tried include occasional tylenol.      Past medical history:  Her past medical history has been reviewed.  Past Medical History:   Diagnosis Date    Arthritis     knee    Depression 03/14/2019    Diverticulosis     GERD (gastroesophageal reflux disease)     mild    Prolonged emergence from general anesthesia     Wears glasses        Her past surgical history has been reviewed.  Past Surgical History:   Procedure Laterality Date    BLADDER SUSPENSION      CHOLECYSTECTOMY      COLONOSCOPY  10/24/2016    Dr. Hill normal colon    COLONOSCOPY N/A 07/23/2024    Repeat in 10 years (7/2034); COLORECTAL CANCER SCREENING, NOT HIGH RISK performed by Jagdeep Magana MD at [facility] ENDOSCOPY    EYE SURGERY Right     DETACHED RETINA    HYSTERECTOMY (CERVIX STATUS UNKNOWN)      Ovaries and tubes still present    KNEE CARTILAGE SURGERY Left     KNEE SURGERY Right     Knee replacement    TOTAL KNEE ARTHROPLASTY Left 05/02/2023    LEFT TOTAL KNEE REPLACEMENT ROBOTIC ASSISTED performed by Jeffry Rivera MD at [facility] OR    VARICOSE VEIN SURGERY           Her medications and allergies

## 2025-07-08 NOTE — PLAN OF CARE
Performed Review of systems (Integumentary, CardioPulmonary, Neurological) by intake and observation. Intake form is in the medical record. Patient has been instructed to contact their primary care physician regarding ROS issues if not already being addressed at this time.    [x] Patient history, allergies, meds reviewed. Medical chart reviewed. See intake form.     OBJECTIVE EXAMINATION     7/8/25  ROM/Strength: (Blank cells denote NT) (*denotes increased pain)       Mvmt (norm) AROM L AROM R PROM L PROM R Notes MMT L MMT R Notes     LUMBAR Flex (90) 75*        Ext (25) 12        SB(25) 20 20          Rotation (30)               HIP Flexion (120)            Abduction (45)            ER (50)            IR (45)            Ext (20)             KNEE Flex (140)            Ext (0)               ANKLE DF (20)            PF (50)            Inversion (30)            Eversion (20)             Repeated Movements: [] Normal  [] Abnormal [] N/A    Palpation:   Patient reported tenderness with palpation  Location:bilat. LS pvm's and left gluteals     Posture:   increased lumbar lordosis    Bandages/Dressings/Incisions:  Not Applicable    Dermatomes: Abnormal findings listed below  NT    Myotomes: Abnormal findings listed below  All WNL    Reflexes: Abnormal findings listed below  S1-2 Seated achilles: 2+ Bilateral  L3-4 Patellar tendon: 0 Bilateral  Clonus normal     Specific Joint Mobility Testing/Accessory Motions:      Lumbar: L3 L4 L5    Special Tests:  fwd bend- aberrant or innominate mvmt Negative  Kemps/quadrant Negative  Trendelenberg Negative  SLR Negative  FADIR (TARAN/LABRAL/PSOAS): Negative  Hip quadrant test: negative  Loob's test (LUIS ENRIQUE):  negative     Gait:    Pattern: decreased jimmie, decreased step length, decreased trunk rotation, and forward flexed posture  Assistive Device Used: no AD    Balance:   Sls maintains 3 seconds left  and right each     Falls Risk Assessment (30 days):   Falls Risk assessed and

## 2025-07-09 ENCOUNTER — HOSPITAL ENCOUNTER (OUTPATIENT)
Dept: PHYSICAL THERAPY | Age: 60
Setting detail: THERAPIES SERIES
Discharge: HOME | End: 2025-07-09
Payer: COMMERCIAL

## 2025-07-09 DIAGNOSIS — M53.86 DECREASED RANGE OF MOTION OF LUMBAR SPINE: ICD-10-CM

## 2025-07-09 DIAGNOSIS — M62.81 WEAKNESS OF TRUNK MUSCULATURE: ICD-10-CM

## 2025-07-09 DIAGNOSIS — R26.89 DECREASED FUNCTIONAL MOBILITY: Primary | ICD-10-CM

## 2025-07-09 PROCEDURE — 97530 THERAPEUTIC ACTIVITIES: CPT

## 2025-07-09 PROCEDURE — 97110 THERAPEUTIC EXERCISES: CPT

## 2025-07-09 PROCEDURE — 97161 PT EVAL LOW COMPLEX 20 MIN: CPT

## 2025-07-16 ENCOUNTER — HOSPITAL ENCOUNTER (OUTPATIENT)
Dept: PHYSICAL THERAPY | Age: 60
Setting detail: THERAPIES SERIES
Discharge: HOME | End: 2025-07-16
Payer: COMMERCIAL

## 2025-07-16 PROCEDURE — 97140 MANUAL THERAPY 1/> REGIONS: CPT

## 2025-07-16 PROCEDURE — 97110 THERAPEUTIC EXERCISES: CPT

## 2025-07-16 NOTE — FLOWSHEET NOTE
walking boot at right ankle foot for the month of Dec. 2024 to treat tendonitis                                         Precautions/ Contra-indications:           Latex allergy:  NO  Pacemaker:    NO  Contraindications for Manipulation: None  Date of Surgery: n/a  Other:    Red Flags:  None    Suicide Screening:   The patient did not verbalize a primary behavioral concern, suicidal ideation, suicidal intent, or demonstrate suicidal behaviors.    Preferred Language for Healthcare:   [x] English       [] other:    Imaging: from 6/26/25   X rays obtained in the office today.  AP and Lateral Lumbar Spine Radiographs:  There is moderate Degenerative Disc Disease L4-5 and L5-S1.  There is moderate Facet Arthropathy.  There is no Spondylolisthesis.  There is no Compression Fractures.    CT abdomen and Pelvis 7/3/24 shows lumbar spondylosis with disc bulge L4-5.     SUBJECTIVE EXAMINATION     Patient stated complaint: Gradual onset in spring of 2025 with no injury reported.  She believes wearing a walking boot for the month of Dec. 2024 may have provoked left buttocks and low back pain. She also reports left foot pain that may not be related.  She also reports improving right ankle ankle pain.    7/16/25   She reports \"sometimes after my exercises I feel a little sore in my back\". Pain is reported to be minor and temporary.         Test used Initial score  7/8/25 07/16/2025   Pain Summary VAS 1-4/10 2-3/10   Functional questionnaire Modified Oswestry 7 or 14%    Other:              Pain:  Pain location: as above  Patient describes pain to be intermittent and dull  Pain decreases with: massage -gun, warm water pool   Pain increases with: Prolonged sitting     Living status: lives in a quad level house    Current Functional Limitations:    Functional Complaints:  sitting for a while       PLOF:  No functional limitations  Pt's sleep is affected?   NO    Occupation/School:  Work/School Status: Full time  Job Duties/Demands:

## 2025-07-18 ENCOUNTER — APPOINTMENT (OUTPATIENT)
Dept: PHYSICAL THERAPY | Age: 60
End: 2025-07-18
Payer: COMMERCIAL

## 2025-07-23 ENCOUNTER — HOSPITAL ENCOUNTER (OUTPATIENT)
Dept: PHYSICAL THERAPY | Age: 60
Setting detail: THERAPIES SERIES
Discharge: HOME | End: 2025-07-23
Payer: COMMERCIAL

## 2025-07-23 PROCEDURE — 97110 THERAPEUTIC EXERCISES: CPT

## 2025-07-23 PROCEDURE — 97140 MANUAL THERAPY 1/> REGIONS: CPT

## 2025-07-23 NOTE — FLOWSHEET NOTE
[facility] - Outpatient Rehabilitation and Therapy: [address], [address], [place] [address] office: [phone] fax: [phone]         Physical Therapy: TREATMENT/PROGRESS NOTE   Patient: Maxine Galdamez (60 y.o. female)   Examination Date: 2025   :  1965 MRN: [id number]   Visit #: 3   Insurance Allowable Auth Needed   [place] []Yes    [x]No    Insurance: Payor: [facility] / Plan: [employer] EMPLOYEES / Product Type: *No Product type* /   Insurance ID: [id number] - (Commercial)  Secondary Insurance (if applicable):    Treatment Diagnosis:     ICD-10-CM    1. Decreased functional mobility  R26.89       2. Weakness of trunk musculature  M62.81       3. Decreased range of motion of lumbar spine  M53.86          Medical Diagnosis:  Multilevel lumbosacral spondylosis with radiculopathy [M47.27]   Referring Physician: Jose Melgoza PA  PCP: Dori Earl PA     Plan of care signed (Y/N):     Date of Patient follow up with Physician:      Plan of Care Report: NO  POC update due: (10 visits /OR AUTH LIMITS, whichever is less)  2025                                             Medical History:  Comorbidities:  Osteoarthritis  Depression  Other: L TKA , R TKA , right eye surgery detached retina   Relevant Medical History: she wore a walking boot at right ankle foot for the month of Dec. 2024 to treat tendonitis                                         Precautions/ Contra-indications:           Latex allergy:  NO  Pacemaker:    NO  Contraindications for Manipulation: None  Date of Surgery: n/a  Other:    Red Flags:  None    Suicide Screening:   The patient did not verbalize a primary behavioral concern, suicidal ideation, suicidal intent, or demonstrate suicidal behaviors.    Preferred Language for Healthcare:   [x] English       [] other:    Imaging: from 25   X rays obtained in the office today.  AP and Lateral Lumbar Spine Radiographs:  There is moderate Degenerative Disc

## 2025-07-25 ENCOUNTER — HOSPITAL ENCOUNTER (OUTPATIENT)
Dept: PHYSICAL THERAPY | Age: 60
Setting detail: THERAPIES SERIES
Discharge: HOME | End: 2025-07-25
Payer: COMMERCIAL

## 2025-07-25 PROCEDURE — 97112 NEUROMUSCULAR REEDUCATION: CPT

## 2025-07-25 PROCEDURE — 97140 MANUAL THERAPY 1/> REGIONS: CPT

## 2025-07-25 PROCEDURE — 97110 THERAPEUTIC EXERCISES: CPT

## 2025-07-25 NOTE — FLOWSHEET NOTE
[facility] - Outpatient Rehabilitation and Therapy: [address], [address], [place] [address] office: [phone] fax: [phone]         Physical Therapy: TREATMENT/PROGRESS NOTE   Patient: Maxine Galdamez (60 y.o. female)   Examination Date: 2025   :  1965 MRN: [id number]   Visit #: 4   Insurance Allowable Auth Needed   MN []Yes    [x]No    Insurance: Payor: [facility] / Plan: [employer] EMPLOYEES / Product Type: *No Product type* /   Insurance ID: [id number] - (Commercial)  Secondary Insurance (if applicable):    Treatment Diagnosis:     ICD-10-CM    1. Decreased functional mobility  R26.89       2. Weakness of trunk musculature  M62.81       3. Decreased range of motion of lumbar spine  M53.86          Medical Diagnosis:  Multilevel lumbosacral spondylosis with radiculopathy [M47.27]   Referring Physician: Jose Melgoza PA  PCP: Dori Earl PA     Plan of care signed (Y/N):     Date of Patient follow up with Physician:      Plan of Care Report: NO  POC update due: (10 visits /OR AUTH LIMITS, whichever is less)  2025                                             Medical History:  Comorbidities:  Osteoarthritis  Depression  Other: L TKA , R TKA , right eye surgery detached retina   Relevant Medical History: she wore a walking boot at right ankle foot for the month of Dec. 2024 to treat tendonitis                                         Precautions/ Contra-indications:           Latex allergy:  NO  Pacemaker:    NO  Contraindications for Manipulation: None  Date of Surgery: n/a  Other:    Red Flags:  None    Suicide Screening:   The patient did not verbalize a primary behavioral concern, suicidal ideation, suicidal intent, or demonstrate suicidal behaviors.    Preferred Language for Healthcare:   [x] English       [] other:    Imaging: from 25   X rays obtained in the office today.  AP and Lateral Lumbar Spine Radiographs:  There is moderate Degenerative Disc

## 2025-07-29 ENCOUNTER — HOSPITAL ENCOUNTER (OUTPATIENT)
Dept: PHYSICAL THERAPY | Age: 60
Setting detail: THERAPIES SERIES
Discharge: HOME | End: 2025-07-29
Payer: COMMERCIAL

## 2025-07-29 PROCEDURE — 97110 THERAPEUTIC EXERCISES: CPT

## 2025-07-29 PROCEDURE — 97112 NEUROMUSCULAR REEDUCATION: CPT

## 2025-07-29 PROCEDURE — 97140 MANUAL THERAPY 1/> REGIONS: CPT

## 2025-07-29 NOTE — FLOWSHEET NOTE
[facility] - Outpatient Rehabilitation and Therapy: [address], [address], [place] [address] office: [phone] fax: [phone]         Physical Therapy: TREATMENT/PROGRESS NOTE   Patient: Maxine Galdamez (60 y.o. female)   Examination Date: 2025   :  1965 MRN: [id number]   Visit #: 5   Insurance Allowable Auth Needed   MN []Yes    [x]No    Insurance: Payor: [facility] / Plan: [employer] EMPLOYEES / Product Type: *No Product type* /   Insurance ID: [id number] - (Commercial)  Secondary Insurance (if applicable):    Treatment Diagnosis:     ICD-10-CM    1. Decreased functional mobility  R26.89       2. Weakness of trunk musculature  M62.81       3. Decreased range of motion of lumbar spine  M53.86          Medical Diagnosis:  Multilevel lumbosacral spondylosis with radiculopathy [M47.27]   Referring Physician: Jose Melgoza PA  PCP: Dori Earl PA     Plan of care signed (Y/N):     Date of Patient follow up with Physician:      Plan of Care Report: NO  POC update due: (10 visits /OR AUTH LIMITS, whichever is less)  2025                                             Medical History:  Comorbidities:  Osteoarthritis  Depression  Other: L TKA , R TKA , right eye surgery detached retina   Relevant Medical History: she wore a walking boot at right ankle foot for the month of Dec. 2024 to treat tendonitis                                         Precautions/ Contra-indications:           Latex allergy:  NO  Pacemaker:    NO  Contraindications for Manipulation: None  Date of Surgery: n/a  Other:    Red Flags:  None    Suicide Screening:   The patient did not verbalize a primary behavioral concern, suicidal ideation, suicidal intent, or demonstrate suicidal behaviors.    Preferred Language for Healthcare:   [x] English       [] other:    Imaging: from 25   X rays obtained in the office today.  AP and Lateral Lumbar Spine Radiographs:  There is moderate Degenerative Disc

## 2025-07-31 ENCOUNTER — HOSPITAL ENCOUNTER (OUTPATIENT)
Dept: PHYSICAL THERAPY | Age: 60
Setting detail: THERAPIES SERIES
Discharge: HOME | End: 2025-07-31
Payer: COMMERCIAL

## 2025-07-31 PROCEDURE — 97112 NEUROMUSCULAR REEDUCATION: CPT

## 2025-07-31 PROCEDURE — 97140 MANUAL THERAPY 1/> REGIONS: CPT

## 2025-07-31 PROCEDURE — 97110 THERAPEUTIC EXERCISES: CPT

## 2025-07-31 NOTE — FLOWSHEET NOTE
[facility] - Outpatient Rehabilitation and Therapy: [address], [address], [place] [address] office: [phone] fax: [phone]         Physical Therapy: TREATMENT/PROGRESS NOTE   Patient: Maxine Galdamez (60 y.o. female)   Examination Date: 2025   :  1965 MRN: [id number]   Visit #: 6   Insurance Allowable Auth Needed   MN []Yes    [x]No    Insurance: Payor: [facility] / Plan: [employer] EMPLOYEES / Product Type: *No Product type* /   Insurance ID: [id number] - (Commercial)  Secondary Insurance (if applicable):    Treatment Diagnosis:     ICD-10-CM    1. Decreased functional mobility  R26.89       2. Weakness of trunk musculature  M62.81       3. Decreased range of motion of lumbar spine  M53.86          Medical Diagnosis:  Multilevel lumbosacral spondylosis with radiculopathy [M47.27]   Referring Physician: Jose Melgoza PA  PCP: Dori Earl PA     Plan of care signed (Y/N):     Date of Patient follow up with Physician:      Plan of Care Report: NO  POC update due: (10 visits /OR AUTH LIMITS, whichever is less)  2025                                             Medical History:  Comorbidities:  Osteoarthritis  Depression  Other: L TKA , R TKA , right eye surgery detached retina   Relevant Medical History: she wore a walking boot at right ankle foot for the month of Dec. 2024 to treat tendonitis                                         Precautions/ Contra-indications:           Latex allergy:  NO  Pacemaker:    NO  Contraindications for Manipulation: None  Date of Surgery: n/a  Other:    Red Flags:  None    Suicide Screening:   The patient did not verbalize a primary behavioral concern, suicidal ideation, suicidal intent, or demonstrate suicidal behaviors.    Preferred Language for Healthcare:   [x] English       [] other:    Imaging: from 25   X rays obtained in the office today.  AP and Lateral Lumbar Spine Radiographs:  There is moderate Degenerative Disc

## 2025-08-05 ENCOUNTER — HOSPITAL ENCOUNTER (OUTPATIENT)
Dept: PHYSICAL THERAPY | Age: 60
Setting detail: THERAPIES SERIES
Discharge: HOME | End: 2025-08-05
Payer: COMMERCIAL

## 2025-08-05 PROCEDURE — 97140 MANUAL THERAPY 1/> REGIONS: CPT

## 2025-08-05 PROCEDURE — 97112 NEUROMUSCULAR REEDUCATION: CPT

## 2025-08-06 ENCOUNTER — OFFICE VISIT (OUTPATIENT)
Dept: ORTHOPEDIC SURGERY | Age: 60
End: 2025-08-06
Payer: COMMERCIAL

## 2025-08-06 VITALS — WEIGHT: 249 LBS | HEIGHT: 65 IN | BODY MASS INDEX: 41.48 KG/M2

## 2025-08-06 DIAGNOSIS — M79.672 PAIN OF LEFT HEEL: Primary | ICD-10-CM

## 2025-08-06 PROCEDURE — 99213 OFFICE O/P EST LOW 20 MIN: CPT | Performed by: PHYSICIAN ASSISTANT

## 2025-08-07 ENCOUNTER — APPOINTMENT (OUTPATIENT)
Dept: PHYSICAL THERAPY | Age: 60
End: 2025-08-07
Payer: COMMERCIAL

## 2025-08-13 ENCOUNTER — APPOINTMENT (OUTPATIENT)
Dept: PHYSICAL THERAPY | Age: 60
End: 2025-08-13
Payer: COMMERCIAL

## 2025-08-19 ENCOUNTER — PATIENT MESSAGE (OUTPATIENT)
Dept: ORTHOPEDIC SURGERY | Age: 60
End: 2025-08-19

## 2025-08-19 ENCOUNTER — APPOINTMENT (OUTPATIENT)
Dept: PHYSICAL THERAPY | Age: 60
End: 2025-08-19
Payer: COMMERCIAL

## 2025-08-19 DIAGNOSIS — M47.27 MULTILEVEL LUMBOSACRAL SPONDYLOSIS WITH RADICULOPATHY: ICD-10-CM

## 2025-08-19 RX ORDER — MELOXICAM 15 MG/1
15 TABLET ORAL DAILY
Qty: 30 TABLET | Refills: 1 | Status: SHIPPED | OUTPATIENT
Start: 2025-08-19

## (undated) DEVICE — DRAPE,ORTHOMAX,EXTREMITY: Brand: MEDLINE

## (undated) DEVICE — TOWEL,OR,DSP,ST,BLUE,STD,4/PK,20PK/CS: Brand: MEDLINE

## (undated) DEVICE — GLOVE SURG SZ 8 L12IN FNGR THK79MIL GRN LTX FREE

## (undated) DEVICE — SOLUTION IV IRRIG POUR BRL 0.9% SODIUM CHL 2F7124

## (undated) DEVICE — PIN BNE FIX L110MM DIA32MM

## (undated) DEVICE — SUTURE VCRL + SZ 1 L18IN ABSRB UD L36MM CT-1 1/2 CIR VCP841D

## (undated) DEVICE — SUTURE ABSORBABLE MONOFILAMENT 1-0 OS8 14 IN STRATAFIX SPRL SXPD2B202

## (undated) DEVICE — PIN BNE FIX TEMP L140MM DIA4MM MAKO

## (undated) DEVICE — SOLUTION PREP PAINT POV IOD FOR SKIN MUCOUS MEM

## (undated) DEVICE — BANDAGE COMPR W6INXL15YD WHT BGE POLY COT WV E HK LOOP CLSR

## (undated) DEVICE — PAD,NON-ADHERENT,3X8,STERILE,LF,1/PK: Brand: MEDLINE

## (undated) DEVICE — TOTAL KNEE: Brand: MEDLINE INDUSTRIES, INC.

## (undated) DEVICE — SOLUTION IV 1000ML 0.9% SOD CHL FOR IRRIG PLAS CONT

## (undated) DEVICE — BOOT POS LEG DEMAYO

## (undated) DEVICE — KIT DRP FOR RIO ROBOTIC ARM ASST SYS

## (undated) DEVICE — KIT TRK KNEE PROC VIZADISC

## (undated) DEVICE — SYSTEM SKIN CLOSURE 42CM DERMABOND PRINEO

## (undated) DEVICE — SUTURE STRATAFIX SPRL SZ 2 0 L14IN ABSRB UD MH L36MM 1 2 CIR SXMD2B401

## (undated) DEVICE — KIT INT FIX FEM TIB CKPT MAKOPLASTY

## (undated) DEVICE — DUAL CUT SAGITTAL BLADE

## (undated) DEVICE — GOWN SIRUS NONREIN XL W/TWL: Brand: MEDLINE INDUSTRIES, INC.

## (undated) DEVICE — BASIC SINGLE BASIN 1-LF: Brand: MEDLINE INDUSTRIES, INC.

## (undated) DEVICE — COTTON UNDERCAST PADDING,CRIMPED FINISH: Brand: WEBRIL

## (undated) DEVICE — SUTURE STRATAFIX SPRL SZ 3-0 L12IN ABSRB UD FS-1 L30X30CM SXMP2B410

## (undated) DEVICE — 1010 S-DRAPE TOWEL DRAPE 10/BX: Brand: STERI-DRAPE™

## (undated) DEVICE — CORD RETRCT SIL

## (undated) DEVICE — GLOVE ORTHO 8   MSG9480